# Patient Record
Sex: FEMALE | Race: BLACK OR AFRICAN AMERICAN | NOT HISPANIC OR LATINO | Employment: FULL TIME | URBAN - METROPOLITAN AREA
[De-identification: names, ages, dates, MRNs, and addresses within clinical notes are randomized per-mention and may not be internally consistent; named-entity substitution may affect disease eponyms.]

---

## 2017-01-16 PROCEDURE — 96372 THER/PROPH/DIAG INJ SC/IM: CPT

## 2017-01-16 PROCEDURE — 99283 EMERGENCY DEPT VISIT LOW MDM: CPT | Mod: 25

## 2017-01-17 ENCOUNTER — HOSPITAL ENCOUNTER (EMERGENCY)
Facility: HOSPITAL | Age: 62
Discharge: HOME | End: 2017-01-17
Attending: EMERGENCY MEDICINE
Payer: MEDICAID

## 2017-01-17 VITALS
HEIGHT: 67 IN | SYSTOLIC BLOOD PRESSURE: 102 MMHG | DIASTOLIC BLOOD PRESSURE: 65 MMHG | RESPIRATION RATE: 14 BRPM | OXYGEN SATURATION: 97 % | BODY MASS INDEX: 21.97 KG/M2 | HEART RATE: 68 BPM | WEIGHT: 140 LBS | TEMPERATURE: 99 F

## 2017-01-17 DIAGNOSIS — J06.9 VIRAL URI WITH COUGH: ICD-10-CM

## 2017-01-17 DIAGNOSIS — M54.31 SCIATICA OF RIGHT SIDE: Primary | ICD-10-CM

## 2017-01-17 PROCEDURE — 63600175 PHARM REV CODE 636 W HCPCS: Performed by: EMERGENCY MEDICINE

## 2017-01-17 RX ORDER — GUAIFENESIN/DEXTROMETHORPHAN 100-10MG/5
5 SYRUP ORAL 4 TIMES DAILY PRN
Qty: 120 ML | Refills: 0 | Status: SHIPPED | OUTPATIENT
Start: 2017-01-17 | End: 2017-01-27

## 2017-01-17 RX ORDER — NAPROXEN 500 MG/1
500 TABLET ORAL 2 TIMES DAILY WITH MEALS
Qty: 20 TABLET | Refills: 0 | Status: ON HOLD | OUTPATIENT
Start: 2017-01-17 | End: 2020-05-10

## 2017-01-17 RX ORDER — METHOCARBAMOL 750 MG/1
1500 TABLET, FILM COATED ORAL 3 TIMES DAILY
Qty: 30 TABLET | Refills: 0 | Status: SHIPPED | OUTPATIENT
Start: 2017-01-17 | End: 2017-01-27

## 2017-01-17 RX ORDER — BETAMETHASONE SODIUM PHOSPHATE AND BETAMETHASONE ACETATE 3; 3 MG/ML; MG/ML
12 INJECTION, SUSPENSION INTRA-ARTICULAR; INTRALESIONAL; INTRAMUSCULAR; SOFT TISSUE
Status: COMPLETED | OUTPATIENT
Start: 2017-01-17 | End: 2017-01-17

## 2017-01-17 RX ORDER — KETOROLAC TROMETHAMINE 30 MG/ML
10 INJECTION, SOLUTION INTRAMUSCULAR; INTRAVENOUS
Status: COMPLETED | OUTPATIENT
Start: 2017-01-17 | End: 2017-01-17

## 2017-01-17 RX ADMIN — KETOROLAC TROMETHAMINE 10 MG: 30 INJECTION, SOLUTION INTRAMUSCULAR at 01:01

## 2017-01-17 RX ADMIN — BETAMETHASONE SODIUM PHOSPHATE AND BETAMETHASONE ACETATE 12 MG: 3; 3 INJECTION, SUSPENSION INTRA-ARTICULAR; INTRALESIONAL; INTRAMUSCULAR at 01:01

## 2017-01-17 NOTE — ED AVS SNAPSHOT
[facility]  [place] [address]               Lindsey Rust   2017  1:22 AM   ED    Description:  Female : 1955   Department:  [facility]           Your Care was Coordinated By:     Provider Role From To    Rianna Gorman MD Attending Provider 17 [phone] --      Reason for Visit     Back Pain           Diagnoses this Visit        Comments    Sciatica of right side    -  Primary     Viral URI with cough           ED Disposition     ED Disposition Condition Comment    Discharge             To Do List           Follow-up Information     Follow up with winston meza In 1 week(s).       These Medications        Disp Refills Start End    methocarbamol (ROBAXIN) 750 MG Tab 30 tablet 0 2017    Take 2 tablets (1,500 mg total) by mouth 3 (three) times daily. - Oral    naproxen (NAPROSYN) 500 MG tablet 20 tablet 0 2017     Take 1 tablet (500 mg total) by mouth 2 (two) times daily with meals. - Oral    dextromethorphan-guaifenesin  mg/5 ml (ROBITUSSIN-DM)  mg/5 mL liquid 120 mL 0 2017    Take 5 mLs by mouth 4 (four) times daily as needed (cough). - Oral      [facility] On [facility]     [facility] -  Assistance  Registered nurses in the [facility] provide clinical advisement, health education, appointment booking, and other advisory services.  Call for this free service at [phone].             Medications           START taking these NEW medications        Refills    methocarbamol (ROBAXIN) 750 MG Tab 0    Sig: Take 2 tablets (1,500 mg total) by mouth 3 (three) times daily.    Class: Print    Route: Oral    naproxen (NAPROSYN) 500 MG tablet 0    Sig: Take 1 tablet (500 mg total) by mouth 2 (two) times daily with meals.    Class: Print    Route: Oral    dextromethorphan-guaifenesin  mg/5 ml (ROBITUSSIN-DM)  mg/5 mL liquid 0    Sig: Take 5 mLs  "by mouth 4 (four) times daily as needed (cough).    Class: Print    Route: Oral      These medications were administered today        Dose Freq    ketorolac injection 10 mg 10 mg ED 1 Time    Sig: Inject 10 mg into the muscle ED 1 Time.    Class: Normal    Route: Intramuscular    betamethasone acetate-betamethasone sodium phosphate injection 12 mg 12 mg ED 1 Time    Sig: Inject 2 mLs (12 mg total) into the muscle ED 1 Time.    Class: Normal    Route: Intramuscular           Verify that the below list of medications is an accurate representation of the medications you are currently taking.  If none reported, the list may be blank. If incorrect, please contact your healthcare provider. Carry this list with you in case of emergency.           Current Medications     dextromethorphan-guaifenesin  mg/5 ml (ROBITUSSIN-DM)  mg/5 mL liquid Take 5 mLs by mouth 4 (four) times daily as needed (cough).    methocarbamol (ROBAXIN) 750 MG Tab Take 2 tablets (1,500 mg total) by mouth 3 (three) times daily.    naproxen (NAPROSYN) 500 MG tablet Take 1 tablet (500 mg total) by mouth 2 (two) times daily with meals.           Clinical Reference Information           Your Vitals Were     BP Pulse Temp Resp Height Weight    116/65 (BP Location: Left arm, Patient Position: Sitting) 75 98.5 °F (36.9 °C) (Oral) 18 5' 7" (1.702 m) 63.5 kg (140 lb)    SpO2 BMI             95% 21.93 kg/m2         Allergies as of 1/17/2017     No Known Allergies      Immunizations Administered on Date of Encounter - 1/17/2017     None      ED Micro, Lab, POCT     None      ED Imaging Orders     None        Discharge Instructions           Sciatica    Sciatica is a condition that causes pain in the lower back and down into the buttock, hip, and leg. Sometimes the leg pain can happen without any back pain. Sciatica happens when a spinal nerve is irritated or has pressure put on it as comes out of the spinal canal in the lower back. This most often " happens when a bulge or rupture of a nearby spinal disk presses on the nerve. Sciatica can also be caused by a narrowing of the spinal canal (spinal stenosis) or spasm of the muscle in the buttocks that the sciatic nerve passes through (pyriform muscle). Sciatica is also called lumbar radiculopathy.  Sciatica may begin after a sudden twisting or bending force, such as in a car accident. Or it can happen after a simple awkward movement. In either case, muscle spasm often also happens. Muscle spasm makes the pain worse.  A health care provider makes a diagnosis of sciatica from your symptoms and a physical exam. Unless you had an injury from a car accident or fall, you usually wont have X-rays taken at this time. This is because the nerves and disks in your back cant be seen on an X-ray. If the provider sees signs of a compressed nerve, you will need to schedule an MRI scan as an outpatient. Signs of a compressed nerve include loss of strength in a leg.  Most sciatica gets better with medicine, exercise, and physical therapy. If your symptoms continue after at least 3 months of medical treatment, you may need surgery.  Home care  Follow these tips when caring for yourself at home:  · You may need to stay in bed the first few days. But as soon as possible, begin sitting or walking. This will help you avoid problems that come from staying in bed for long periods.  · When in bed, try to find a position that is comfortable. A firm mattress is best. Try lying flat on your back with pillows under your knees. You can also try lying on your side with your knees bent up toward your chest and a pillow between your knees.  · Avoid sitting for long periods. This puts more stress on your lower back than standing or walking.  · Use heat from a hot shower, hot bath, or heating pad to help ease pain. Massage can also help. You can also try using an ice pack. You can make your own ice pack by putting ice cubes in a plastic bag. Wrap  the bag in a thin towel. Try both heat and cold to see which works best. Use the method that feels best for 20 minutes several times a day.  · You may use acetaminophen or ibuprofen to ease pain, unless another pain medicine was prescribed. Note: If you have chronic liver or kidney disease, talk with your health care provider before taking these medicines. Also talk with your provider if youve had a stomach ulcer or GI bleeding.  · Use safe lifting methods. Dont lift anything heavier than 15 pounds until all of the pain is gone.  Follow-up care  Follow up with your health care provider if your symptoms dont start to get better after 1 week. You may need physical therapy or additional tests.  If X-rays were taken, they will be looked at by a radiologist. You will be told of any new findings that may affect your care.  When to seek medical advice  Call your health care provider right away if any of these occur:  · Pain gets worse even after taking prescribed medicine  · Weakness or numbness in 1 or both legs or hips  · Numbness in your groin or genital area  · You cant control your bowel or bladder  · Fever  · Redness or swelling over your back or spine   © 2960-5784 [employer]. [address] [place] [address]. All rights reserved. This information is not intended as a substitute for professional medical care. Always follow your healthcare professional's instructions.          Understanding Lumbar Radiculopathy    Lumbar radiculopathy is irritation or inflammation of a nerve root in the low back. It causes symptoms that spread out from the back down one or both legs. To understand this condition, it helps to understand the parts of the spine:  · Vertebrae. These are bones that stack to form the spine. The lumbar spine contains the 5 bottom vertebrae.  · Disks. These are soft pads of tissue between the vertebrae. They act as shock absorbers for the spine.  · Spinal canal. This is a tunnel  formed within the stacked vertebrae. In the lumbar spine, nerves run through this canal.  · Nerves. These branch off and leave the spinal canal, traveling out to parts of the body. As they leave the spinal canal, nerves pass through openings between the vertebrae. The nerve root is the part of the nerve that is closest to the spinal canal.  · Sciatic nerve. This is a large nerve formed from several nerve roots in the low back. This nerve extends down the back of the leg to the foot.  With lumbar radiculopathy, nerve roots in the low back become irritated. This leads to pain and symptoms. The sciatic nerve is commonly involved, so the condition is often called sciatica.  What causes lumbar radiculopathy?  Aging, injury, poor posture, extra body weight, and other issues can lead to problems in the low back. These problems may then irritate nerve roots. They include:  · Damage to a disk in the lumbar spine. The damaged disk may then press on nearby nerve roots.  · Degeneration from wear and tear, and aging. This can lead to narrowing (stenosis) of the openings between the vertebrae. The narrowed openings press on nerve roots as they leave the spinal canal.  · Unstable spine. This is when a vertebra slips forward. It can then press on a nerve root.  Other, less common things can put pressure on nerves in the low back. These include diabetes, infection, or a tumor.  Symptoms of lumbar radiculopathy  These include:  · Pain in the low back  · Pain, numbness, tingling, or weakness that travels into the buttocks, hip, groin, or leg  · Muscle spasms  Treatment for lumbar radiculopathy  In most cases, your healthcare provider will first try treatments that help relieve symptoms. These may include:  · Prescription and over-the-counter pain medicines. These help relieve pain, swelling, and irritation.  · Limits on positions and activities that increase pain. But lying in bed or avoiding all movement is only recommended for a  short period of time.  · Physical therapy, including exercises and stretches. This helps decrease pain and increase movement and function.  · Steroid shots into the lower back. This may help relieve symptoms for a time.  · Weight-loss program. If you are overweight, losing extra pounds may help relieve symptoms.  In some cases, you may need surgery to fix the underlying problem. This depends on the cause, the symptoms, and how long the pain has lasted.  Possible complications  Over time, an irritated and inflamed nerve may become damaged. This may lead to long-lasting (permanent) numbness or weakness in your legs and feet. If symptoms change suddenly or get worse, be sure to let your healthcare provider know.  When to call your healthcare provider  Call your healthcare provider right away if you have any of these:  · New pain or pain that gets worse  · New or increasing weakness, tingling, or numbness in your leg or foot  · Problems controlling your bladder or bowel   © 9457-9375 [employer]. [address] [place] [address]. All rights reserved. This information is not intended as a substitute for professional medical care. Always follow your healthcare professional's instructions.          Discharge References/Attachments     URI, VIRAL, NO ABX (ADULT) (ENGLISH)      [facility] Sign-Up     Activating your [facility] account is as easy as 1-2-3!     1) Visit [email or web address], select Sign Up Now, enter this activation code and your date of birth, then select Next.  [email or web address]  Expires: 3/3/2017  1:34 AM      2) Create a username and password to use when you visit [facility] in the future and select a security question in case you lose your password and select Next.    3) Enter your e-mail address and click Sign Up!    Additional Information  If you have questions, please e-mail [email or web address] or call [phone] to talk to our [facility] staff. Remember, [facility] is NOT to be used  for urgent needs. For medical emergencies, dial 911.         Smoking Cessation     If you would like to quit smoking:   You may be eligible for free services if you are a [place] resident and started smoking cigarettes before September 1, 1988.  Call the Smoking Cessation Trust (SCT) toll free at (003) 271-5146 or (798) 254-2036.   Call 1-800-QUIT-NOW if you do not meet the above criteria.             [facility] complies with applicable Federal civil rights laws and does not discriminate on the basis of race, color, national origin, age, disability, or sex.        Language Assistance Services     ATTENTION: Language assistance services are available, free of charge. Please call [phone].      ATENCIÓN: Si habla valarie, tiene a cornelius disposición servicios gratuitos de asistencia lingüística. Llame al [phone].     CHÚ Ý: N?u b?n nói Ti?ng Vi?t, có các d?ch v? h? tr? ngôn ng? mi?n phí dành cho b?n. G?i s? [phone].

## 2017-01-17 NOTE — ED TRIAGE NOTES
"Pt reports to ED with c/o R lower back sciatic nerve pain 8/10; pt also has been when R leg is moved; pt has hx sciatic nerve pain; pt states she also has "a cold in me" for the past few weeks; pt has no other complaints; pt AAOx4.  "

## 2017-01-17 NOTE — DISCHARGE INSTRUCTIONS
Sciatica    Sciatica is a condition that causes pain in the lower back and down into the buttock, hip, and leg. Sometimes the leg pain can happen without any back pain. Sciatica happens when a spinal nerve is irritated or has pressure put on it as comes out of the spinal canal in the lower back. This most often happens when a bulge or rupture of a nearby spinal disk presses on the nerve. Sciatica can also be caused by a narrowing of the spinal canal (spinal stenosis) or spasm of the muscle in the buttocks that the sciatic nerve passes through (pyriform muscle). Sciatica is also called lumbar radiculopathy.  Sciatica may begin after a sudden twisting or bending force, such as in a car accident. Or it can happen after a simple awkward movement. In either case, muscle spasm often also happens. Muscle spasm makes the pain worse.  A health care provider makes a diagnosis of sciatica from your symptoms and a physical exam. Unless you had an injury from a car accident or fall, you usually wont have X-rays taken at this time. This is because the nerves and disks in your back cant be seen on an X-ray. If the provider sees signs of a compressed nerve, you will need to schedule an MRI scan as an outpatient. Signs of a compressed nerve include loss of strength in a leg.  Most sciatica gets better with medicine, exercise, and physical therapy. If your symptoms continue after at least 3 months of medical treatment, you may need surgery.  Home care  Follow these tips when caring for yourself at home:  · You may need to stay in bed the first few days. But as soon as possible, begin sitting or walking. This will help you avoid problems that come from staying in bed for long periods.  · When in bed, try to find a position that is comfortable. A firm mattress is best. Try lying flat on your back with pillows under your knees. You can also try lying on your side with your knees bent up toward your chest and a pillow between your  knees.  · Avoid sitting for long periods. This puts more stress on your lower back than standing or walking.  · Use heat from a hot shower, hot bath, or heating pad to help ease pain. Massage can also help. You can also try using an ice pack. You can make your own ice pack by putting ice cubes in a plastic bag. Wrap the bag in a thin towel. Try both heat and cold to see which works best. Use the method that feels best for 20 minutes several times a day.  · You may use acetaminophen or ibuprofen to ease pain, unless another pain medicine was prescribed. Note: If you have chronic liver or kidney disease, talk with your health care provider before taking these medicines. Also talk with your provider if youve had a stomach ulcer or GI bleeding.  · Use safe lifting methods. Dont lift anything heavier than 15 pounds until all of the pain is gone.  Follow-up care  Follow up with your health care provider if your symptoms dont start to get better after 1 week. You may need physical therapy or additional tests.  If X-rays were taken, they will be looked at by a radiologist. You will be told of any new findings that may affect your care.  When to seek medical advice  Call your health care provider right away if any of these occur:  · Pain gets worse even after taking prescribed medicine  · Weakness or numbness in 1 or both legs or hips  · Numbness in your groin or genital area  · You cant control your bowel or bladder  · Fever  · Redness or swelling over your back or spine   © 2004-3456 [employer]. [address] [place] [address], [place] [address]. All rights reserved. This information is not intended as a substitute for professional medical care. Always follow your healthcare professional's instructions.          Understanding Lumbar Radiculopathy    Lumbar radiculopathy is irritation or inflammation of a nerve root in the low back. It causes symptoms that spread out from the back down one or both legs. To  understand this condition, it helps to understand the parts of the spine:  · Vertebrae. These are bones that stack to form the spine. The lumbar spine contains the 5 bottom vertebrae.  · Disks. These are soft pads of tissue between the vertebrae. They act as shock absorbers for the spine.  · Spinal canal. This is a tunnel formed within the stacked vertebrae. In the lumbar spine, nerves run through this canal.  · Nerves. These branch off and leave the spinal canal, traveling out to parts of the body. As they leave the spinal canal, nerves pass through openings between the vertebrae. The nerve root is the part of the nerve that is closest to the spinal canal.  · Sciatic nerve. This is a large nerve formed from several nerve roots in the low back. This nerve extends down the back of the leg to the foot.  With lumbar radiculopathy, nerve roots in the low back become irritated. This leads to pain and symptoms. The sciatic nerve is commonly involved, so the condition is often called sciatica.  What causes lumbar radiculopathy?  Aging, injury, poor posture, extra body weight, and other issues can lead to problems in the low back. These problems may then irritate nerve roots. They include:  · Damage to a disk in the lumbar spine. The damaged disk may then press on nearby nerve roots.  · Degeneration from wear and tear, and aging. This can lead to narrowing (stenosis) of the openings between the vertebrae. The narrowed openings press on nerve roots as they leave the spinal canal.  · Unstable spine. This is when a vertebra slips forward. It can then press on a nerve root.  Other, less common things can put pressure on nerves in the low back. These include diabetes, infection, or a tumor.  Symptoms of lumbar radiculopathy  These include:  · Pain in the low back  · Pain, numbness, tingling, or weakness that travels into the buttocks, hip, groin, or leg  · Muscle spasms  Treatment for lumbar radiculopathy  In most cases, your  healthcare provider will first try treatments that help relieve symptoms. These may include:  · Prescription and over-the-counter pain medicines. These help relieve pain, swelling, and irritation.  · Limits on positions and activities that increase pain. But lying in bed or avoiding all movement is only recommended for a short period of time.  · Physical therapy, including exercises and stretches. This helps decrease pain and increase movement and function.  · Steroid shots into the lower back. This may help relieve symptoms for a time.  · Weight-loss program. If you are overweight, losing extra pounds may help relieve symptoms.  In some cases, you may need surgery to fix the underlying problem. This depends on the cause, the symptoms, and how long the pain has lasted.  Possible complications  Over time, an irritated and inflamed nerve may become damaged. This may lead to long-lasting (permanent) numbness or weakness in your legs and feet. If symptoms change suddenly or get worse, be sure to let your healthcare provider know.  When to call your healthcare provider  Call your healthcare provider right away if you have any of these:  · New pain or pain that gets worse  · New or increasing weakness, tingling, or numbness in your leg or foot  · Problems controlling your bladder or bowel   © 6688-2298 [employer]. [address] [place] [address], [place] [address]. All rights reserved. This information is not intended as a substitute for professional medical care. Always follow your healthcare professional's instructions.

## 2017-01-17 NOTE — ED PROVIDER NOTES
"Encounter Date: 1/16/2017    SCRIBE #1 NOTE: I, Molly Kearney, am scribing for, and in the presence of,  Rianna Gorman MD. I have scribed the following portions of the note - Other sections scribed: HPI, ROS, and Physical Exam.       History     Chief Complaint   Patient presents with    Back Pain     "My sciatic nerve." Reports back pain radiating down legs.      Review of patient's allergies indicates:  No Known Allergies  HPI Comments: CC: Back Pain    HPI: This 61 y.o. Female who has sciatica presents to the ED c/o acute, constant, 8/10 right lower back pain that radiates down her bilateral, posterior legs that began yesterday.  Pt reports pain is worse with bending and walking.  Pt reports as a result of her pain, she almost fell on while attempting to complete her work duties.  Pt reports she is currently employed as a .  Pt attributes her pain to an exacerbation of her sciatica and states she typically receives an injection in the ED to help alleviate her pain.  Pt denies extremity numbness, extremity weakness, fever, chills, abdominal pain, rash, or any other associated symptoms.  No alleviating factors.    The history is provided by the patient. No  was used.     Past Medical History   Diagnosis Date    Sciatic pain      Past Medical History Pertinent Negatives   Diagnosis Date Noted    Asthma 4/4/2016    Diabetes mellitus 12/22/2012    Hypertension 12/22/2012    Seizures 4/4/2016     Past Surgical History   Procedure Laterality Date    Vaginal delivery       X5     Family History   Problem Relation Age of Onset    Diabetes Mother     Asthma Daughter     Asthma Son      Social History   Substance Use Topics    Smoking status: Current Some Day Smoker     Packs/day: 1.00     Years: 20.00     Types: Cigarettes    Smokeless tobacco: None    Alcohol use Yes      Comment: twice a week     Review of Systems   Constitutional: Negative for chills and fever.   HENT: Negative " for ear pain and sore throat.    Respiratory: Negative for cough and shortness of breath.    Gastrointestinal: Negative for abdominal pain, diarrhea, nausea and vomiting.   Genitourinary: Negative for dysuria.   Musculoskeletal: Positive for back pain and myalgias.   Skin: Negative for rash.   Neurological: Negative for weakness, numbness and headaches.       Physical Exam   Initial Vitals   BP Pulse Resp Temp SpO2   01/16/17 2328 01/16/17 2328 01/16/17 2328 01/16/17 2328 01/16/17 2328   116/65 75 18 98.5 °F (36.9 °C) 95 %     Physical Exam    Nursing note and vitals reviewed.  Constitutional: She appears well-developed and well-nourished.   HENT:   Head: Normocephalic and atraumatic.   Eyes: EOM are normal.   Neck: Neck supple.   Cardiovascular: Normal rate and regular rhythm.   Pulmonary/Chest: Breath sounds normal. No respiratory distress.   Abdominal: Normal appearance.   Musculoskeletal: Normal range of motion.   Patient has tenderness to palpation over the right sciatic nerve. Patient has a positive right sided straight leg test.   Neurological: She is alert and oriented to person, place, and time. She has normal strength. No cranial nerve deficit or sensory deficit.   Skin: Skin is warm and dry. No rash noted. No erythema.   Psychiatric: She has a normal mood and affect.         ED Course   Procedures  Labs Reviewed - No data to display          Medical Decision Making:   History:   Old Medical Records: I decided to obtain old medical records.  Initial Assessment:   This is an urgent evaluation of a 61 year old woman who presented with a complaint of right lower back pain consistent with her previous episodes of sciatica.  Differential Diagnosis:   Differential diagnoses included musculoskeletal strain versus sprain, cauda equina, sciatica  ED Management:  On physical examination, patient was in no acute distress.  Lung sounds were clear to auscultation bilaterally and heart sounds were within normal limits.   Abdomen was soft, nontender, nondistended with good bowel sounds throughout.  There is no tenderness over McBurney's or Carmona's points.  There were no peritoneal signs or CVA tenderness. Patient was neurologically intact without sensory, motor, or visual deficits.  Patient was not ataxic.  Patient had reproducible tenderness to palpation of the right sciatic nerve with a positive right-sided straight leg test.  Her symptoms are consistent with sciatica.  Patient was treated with Celestone and ketorolac in the emergency department.  She was discharged home with Robaxin and naproxen.  Of note, on discharge, patient reported she has had a cough for which she requested cough syrup.  Given lung sounds were clear to auscultation bilaterally, and oxygen saturation was within normal limits, I do not believe patient needs emergent imaging.  I will prescribe her however Robitussin-DM.  I've advised her to follow with her primary care physician for reevaluation in 2 days and have given her return precautions.    Rianna Gorman MD  4:12 AM  1/17/2017              Scribe Attestation:   Scribe #1: I performed the above scribed service and the documentation accurately describes the services I performed. I attest to the accuracy of the note.    Attending Attestation:           Physician Attestation for Scribe:  Physician Attestation Statement for Scribe #1: I, Rianna Gorman MD, reviewed documentation, as scribed by Molly Kearney in my presence, and it is both accurate and complete.                 ED Course     Clinical Impression:   The primary encounter diagnosis was Sciatica of right side. A diagnosis of Viral URI with cough was also pertinent to this visit.    Disposition:   Disposition: Discharged  Condition: Stable       Rianna Gorman MD  01/27/17 0413

## 2019-02-06 ENCOUNTER — HOSPITAL ENCOUNTER (EMERGENCY)
Facility: HOSPITAL | Age: 64
Discharge: HOME | End: 2019-02-06
Attending: EMERGENCY MEDICINE
Payer: MEDICAID

## 2019-02-06 VITALS
OXYGEN SATURATION: 96 % | BODY MASS INDEX: 24.48 KG/M2 | TEMPERATURE: 98 F | HEART RATE: 61 BPM | WEIGHT: 156 LBS | SYSTOLIC BLOOD PRESSURE: 148 MMHG | DIASTOLIC BLOOD PRESSURE: 63 MMHG | RESPIRATION RATE: 16 BRPM | HEIGHT: 67 IN

## 2019-02-06 DIAGNOSIS — M54.41 ACUTE RIGHT-SIDED LOW BACK PAIN WITH RIGHT-SIDED SCIATICA: Primary | ICD-10-CM

## 2019-02-06 DIAGNOSIS — M62.838 MUSCLE SPASM: ICD-10-CM

## 2019-02-06 PROCEDURE — 25000003 PHARM REV CODE 250: Performed by: NURSE PRACTITIONER

## 2019-02-06 PROCEDURE — 63600175 PHARM REV CODE 636 W HCPCS: Performed by: NURSE PRACTITIONER

## 2019-02-06 PROCEDURE — 96372 THER/PROPH/DIAG INJ SC/IM: CPT

## 2019-02-06 PROCEDURE — 99284 EMERGENCY DEPT VISIT MOD MDM: CPT | Mod: 25

## 2019-02-06 RX ORDER — DIAZEPAM 5 MG/1
5 TABLET ORAL EVERY 6 HOURS PRN
Qty: 10 TABLET | Refills: 0 | Status: ON HOLD | OUTPATIENT
Start: 2019-02-06 | End: 2023-11-29 | Stop reason: HOSPADM

## 2019-02-06 RX ORDER — HYDROCODONE BITARTRATE AND ACETAMINOPHEN 5; 325 MG/1; MG/1
1 TABLET ORAL EVERY 4 HOURS PRN
Qty: 18 TABLET | Refills: 0 | Status: ON HOLD | OUTPATIENT
Start: 2019-02-06 | End: 2023-11-29 | Stop reason: HOSPADM

## 2019-02-06 RX ORDER — DIAZEPAM 5 MG/1
5 TABLET ORAL
Status: COMPLETED | OUTPATIENT
Start: 2019-02-06 | End: 2019-02-06

## 2019-02-06 RX ORDER — PREDNISONE 20 MG/1
20 TABLET ORAL DAILY
Qty: 5 TABLET | Refills: 0 | Status: SHIPPED | OUTPATIENT
Start: 2019-02-06 | End: 2019-02-11

## 2019-02-06 RX ORDER — HYDROCODONE BITARTRATE AND ACETAMINOPHEN 5; 325 MG/1; MG/1
1 TABLET ORAL
Status: COMPLETED | OUTPATIENT
Start: 2019-02-06 | End: 2019-02-06

## 2019-02-06 RX ORDER — DEXAMETHASONE SODIUM PHOSPHATE 4 MG/ML
8 INJECTION, SOLUTION INTRA-ARTICULAR; INTRALESIONAL; INTRAMUSCULAR; INTRAVENOUS; SOFT TISSUE
Status: COMPLETED | OUTPATIENT
Start: 2019-02-06 | End: 2019-02-06

## 2019-02-06 RX ADMIN — HYDROCODONE BITARTRATE AND ACETAMINOPHEN 1 TABLET: 5; 325 TABLET ORAL at 11:02

## 2019-02-06 RX ADMIN — DEXAMETHASONE SODIUM PHOSPHATE 8 MG: 4 INJECTION, SOLUTION INTRAMUSCULAR; INTRAVENOUS at 11:02

## 2019-02-06 RX ADMIN — DIAZEPAM 5 MG: 5 TABLET ORAL at 11:02

## 2019-02-06 NOTE — ED PROVIDER NOTES
Encounter Date: 2/6/2019       History     Chief Complaint   Patient presents with    Back Pain     rEPORTS HAVING LOWER BACK PAIN TO RIGHT LEG FO PAST FEW WEEKS     Patient presents to ER with right-sided sciatica that is radiating down right leg.  Patient states she has had the pain for about a week now.  Patient states she has had this pain on and off before and comes and goes.  Patient states she has not had a flare-up in a while.  Patient denies any tingling or numbness of extremities.  Patient denies any bowel or bladder dysfunction.  Patient ambulated in the ER.  Patient states she has been taking over-the-counter naproxen which has not been helping very much.  Patient denies any fever, chills, nausea, vomiting, shortness of breath, dizziness.  Patient has no prior medical problems except sciatica pain and takes no prescription medications at this time.          Review of patient's allergies indicates:  No Known Allergies  Past Medical History:   Diagnosis Date    Sciatic pain      Past Surgical History:   Procedure Laterality Date    VAGINAL DELIVERY      X5     Family History   Problem Relation Age of Onset    Diabetes Mother     Asthma Daughter     Asthma Son      Social History     Tobacco Use    Smoking status: Current Some Day Smoker     Packs/day: 0.50     Years: 20.00     Pack years: 10.00     Types: Cigarettes   Substance Use Topics    Alcohol use: Yes     Comment: twice a week    Drug use: No     Review of Systems   Constitutional: Negative.  Negative for activity change, appetite change, diaphoresis, fatigue and fever.   HENT: Negative.  Negative for dental problem, ear discharge and sore throat.    Eyes: Negative.    Respiratory: Negative.  Negative for shortness of breath.    Cardiovascular: Negative.  Negative for chest pain.   Gastrointestinal: Negative.  Negative for nausea.   Endocrine: Negative.    Genitourinary: Negative for dysuria, hematuria and urgency.   Musculoskeletal:  Positive for back pain. Negative for gait problem and joint swelling.   Skin: Negative for rash.   Allergic/Immunologic: Negative.    Neurological: Negative.  Negative for weakness.   Hematological: Negative.  Does not bruise/bleed easily.   Psychiatric/Behavioral: Negative for agitation.   All other systems reviewed and are negative.      Physical Exam     Initial Vitals [02/06/19 0939]   BP Pulse Resp Temp SpO2   136/78 74 18 98.3 °F (36.8 °C) 97 %      MAP       --         Physical Exam    Nursing note and vitals reviewed.  Constitutional: She appears well-developed and well-nourished. She is not diaphoretic. She is cooperative.   HENT:   Head: Normocephalic and atraumatic.   Right Ear: External ear normal.   Left Ear: External ear normal.   Nose: Nose normal.   Mouth/Throat: Oropharynx is clear and moist.   Eyes: Conjunctivae and EOM are normal. Pupils are equal, round, and reactive to light. Right eye exhibits no discharge. No scleral icterus.   Neck: Trachea normal, normal range of motion and full passive range of motion without pain. Neck supple. No thyromegaly present. No tracheal deviation and normal range of motion present. No neck rigidity. No JVD present.   Cardiovascular: Normal rate, regular rhythm, normal heart sounds, intact distal pulses and normal pulses. Exam reveals no gallop and no friction rub.    No murmur heard.  Pulmonary/Chest: Effort normal and breath sounds normal. No stridor. No respiratory distress. She has no wheezes. She has no rhonchi. She has no rales. She exhibits no tenderness.   Abdominal: Soft. Normal appearance and bowel sounds are normal. She exhibits no distension and no mass. There is no tenderness. There is no guarding.   Musculoskeletal: She exhibits no edema.        Right shoulder: She exhibits normal range of motion and no bony tenderness.        Lumbar back: She exhibits decreased range of motion, tenderness, pain and spasm. She exhibits no bony tenderness, no edema and  no deformity.        Back:    Lymphadenopathy:     She has no cervical adenopathy.   Neurological: She is alert and oriented to person, place, and time. She has normal strength and normal reflexes.   Skin: Skin is warm and dry. No rash noted.   Psychiatric: She has a normal mood and affect. Her behavior is normal. Judgment and thought content normal.         ED Course   Procedures  Labs Reviewed - No data to display       Imaging Results    None          Medical Decision Making:   Differential Diagnosis:   Lower back pain, sciatic, cervical radiculopathy, muscle spasm                      Clinical Impression:   The primary encounter diagnosis was Acute right-sided low back pain with right-sided sciatica. A diagnosis of Muscle spasm was also pertinent to this visit.      Disposition:   Disposition: Discharged  Condition: Stable                        Adeline Brooks, TIMOTHY  02/06/19 1150

## 2019-02-06 NOTE — ED TRIAGE NOTES
Patient reports lower back pain that radiates to right leg x 2 weeks. Reports taking Tylenol for the pain with minimal relielf, last taken on yesterday.

## 2020-05-10 ENCOUNTER — HOSPITAL ENCOUNTER (OUTPATIENT)
Facility: HOSPITAL | Age: 65
LOS: 1 days | Discharge: HOME | End: 2020-05-11
Attending: EMERGENCY MEDICINE | Admitting: INTERNAL MEDICINE
Payer: MEDICAID

## 2020-05-10 DIAGNOSIS — A41.9 SEPSIS: ICD-10-CM

## 2020-05-10 DIAGNOSIS — N12 PYELONEPHRITIS: Primary | ICD-10-CM

## 2020-05-10 PROBLEM — Z78.9 ALCOHOL USE: Status: ACTIVE | Noted: 2020-05-10

## 2020-05-10 PROBLEM — N10 ACUTE PYELONEPHRITIS: Status: ACTIVE | Noted: 2020-05-10

## 2020-05-10 PROBLEM — E87.6 HYPOKALEMIA: Status: ACTIVE | Noted: 2020-05-10

## 2020-05-10 LAB
ALBUMIN SERPL BCP-MCNC: 3.6 G/DL (ref 3.5–5.2)
ALP SERPL-CCNC: 108 U/L (ref 55–135)
ALT SERPL W/O P-5'-P-CCNC: 19 U/L (ref 10–44)
ANION GAP SERPL CALC-SCNC: 17 MMOL/L (ref 8–16)
ANION GAP SERPL CALC-SCNC: 18 MMOL/L (ref 8–16)
AST SERPL-CCNC: 22 U/L (ref 10–40)
BACTERIA #/AREA URNS HPF: ABNORMAL /HPF
BASOPHILS # BLD AUTO: 0.03 K/UL (ref 0–0.2)
BASOPHILS NFR BLD: 0.2 % (ref 0–1.9)
BILIRUB SERPL-MCNC: 1 MG/DL (ref 0.1–1)
BILIRUB UR QL STRIP: NEGATIVE
BUN SERPL-MCNC: 9 MG/DL (ref 6–30)
BUN SERPL-MCNC: 9 MG/DL (ref 8–23)
CALCIUM SERPL-MCNC: 9.9 MG/DL (ref 8.7–10.5)
CHLORIDE SERPL-SCNC: 101 MMOL/L (ref 95–110)
CHLORIDE SERPL-SCNC: 99 MMOL/L (ref 95–110)
CHOLEST SERPL-MCNC: 151 MG/DL (ref 120–199)
CHOLEST/HDLC SERPL: 2.8 {RATIO} (ref 2–5)
CLARITY UR: ABNORMAL
CO2 SERPL-SCNC: 26 MMOL/L (ref 23–29)
COLOR UR: ABNORMAL
CREAT SERPL-MCNC: 1.2 MG/DL (ref 0.5–1.4)
CREAT SERPL-MCNC: 1.2 MG/DL (ref 0.5–1.4)
DIFFERENTIAL METHOD: ABNORMAL
EOSINOPHIL # BLD AUTO: 0 K/UL (ref 0–0.5)
EOSINOPHIL NFR BLD: 0 % (ref 0–8)
ERYTHROCYTE [DISTWIDTH] IN BLOOD BY AUTOMATED COUNT: 14.5 % (ref 11.5–14.5)
EST. GFR  (AFRICAN AMERICAN): 56 ML/MIN/1.73 M^2
EST. GFR  (NON AFRICAN AMERICAN): 49 ML/MIN/1.73 M^2
GLUCOSE SERPL-MCNC: 112 MG/DL (ref 70–110)
GLUCOSE SERPL-MCNC: 117 MG/DL (ref 70–110)
GLUCOSE UR QL STRIP: NEGATIVE
HCT VFR BLD AUTO: 46.3 % (ref 37–48.5)
HCT VFR BLD CALC: 49 %PCV (ref 36–54)
HDLC SERPL-MCNC: 54 MG/DL (ref 40–75)
HDLC SERPL: 35.8 % (ref 20–50)
HGB BLD-MCNC: 15 G/DL (ref 12–16)
HGB UR QL STRIP: ABNORMAL
HYALINE CASTS #/AREA URNS LPF: 0 /LPF
IMM GRANULOCYTES # BLD AUTO: 0.1 K/UL (ref 0–0.04)
IMM GRANULOCYTES NFR BLD AUTO: 0.5 % (ref 0–0.5)
KETONES UR QL STRIP: NEGATIVE
LACTATE SERPL-SCNC: 1.4 MMOL/L (ref 0.5–2.2)
LDLC SERPL CALC-MCNC: 80.2 MG/DL (ref 63–159)
LEUKOCYTE ESTERASE UR QL STRIP: ABNORMAL
LIPASE SERPL-CCNC: 8 U/L (ref 4–60)
LYMPHOCYTES # BLD AUTO: 1.6 K/UL (ref 1–4.8)
LYMPHOCYTES NFR BLD: 8.8 % (ref 18–48)
MCH RBC QN AUTO: 31.8 PG (ref 27–31)
MCHC RBC AUTO-ENTMCNC: 32.4 G/DL (ref 32–36)
MCV RBC AUTO: 98 FL (ref 82–98)
MICROSCOPIC COMMENT: ABNORMAL
MONOCYTES # BLD AUTO: 1.4 K/UL (ref 0.3–1)
MONOCYTES NFR BLD: 7.4 % (ref 4–15)
NEUTROPHILS # BLD AUTO: 15.3 K/UL (ref 1.8–7.7)
NEUTROPHILS NFR BLD: 83.1 % (ref 38–73)
NITRITE UR QL STRIP: POSITIVE
NONHDLC SERPL-MCNC: 97 MG/DL
NRBC BLD-RTO: 0 /100 WBC
PH UR STRIP: 6 [PH] (ref 5–8)
PLATELET # BLD AUTO: 290 K/UL (ref 150–350)
PMV BLD AUTO: 10.3 FL (ref 9.2–12.9)
POC IONIZED CALCIUM: 1.14 MMOL/L (ref 1.06–1.42)
POC TCO2 (MEASURED): 30 MMOL/L (ref 23–29)
POCT GLUCOSE: 135 MG/DL (ref 70–110)
POTASSIUM BLD-SCNC: 3.3 MMOL/L (ref 3.5–5.1)
POTASSIUM SERPL-SCNC: 3 MMOL/L (ref 3.5–5.1)
PROT SERPL-MCNC: 8.8 G/DL (ref 6–8.4)
PROT UR QL STRIP: ABNORMAL
RBC # BLD AUTO: 4.71 M/UL (ref 4–5.4)
RBC #/AREA URNS HPF: >100 /HPF (ref 0–4)
SAMPLE: ABNORMAL
SARS-COV-2 RDRP RESP QL NAA+PROBE: NEGATIVE
SODIUM BLD-SCNC: 142 MMOL/L (ref 136–145)
SODIUM SERPL-SCNC: 144 MMOL/L (ref 136–145)
SP GR UR STRIP: 1.01 (ref 1–1.03)
TRIGL SERPL-MCNC: 84 MG/DL (ref 30–150)
TSH SERPL DL<=0.005 MIU/L-ACNC: 0.53 UIU/ML (ref 0.4–4)
URN SPEC COLLECT METH UR: ABNORMAL
UROBILINOGEN UR STRIP-ACNC: ABNORMAL EU/DL
WBC # BLD AUTO: 18.44 K/UL (ref 3.9–12.7)
WBC #/AREA URNS HPF: >100 /HPF (ref 0–5)

## 2020-05-10 PROCEDURE — 83605 ASSAY OF LACTIC ACID: CPT

## 2020-05-10 PROCEDURE — 87086 URINE CULTURE/COLONY COUNT: CPT

## 2020-05-10 PROCEDURE — 82330 ASSAY OF CALCIUM: CPT

## 2020-05-10 PROCEDURE — G0378 HOSPITAL OBSERVATION PER HR: HCPCS

## 2020-05-10 PROCEDURE — 63600175 PHARM REV CODE 636 W HCPCS: Performed by: INTERNAL MEDICINE

## 2020-05-10 PROCEDURE — 83690 ASSAY OF LIPASE: CPT

## 2020-05-10 PROCEDURE — 25500020 PHARM REV CODE 255: Performed by: PHYSICIAN ASSISTANT

## 2020-05-10 PROCEDURE — 84132 ASSAY OF SERUM POTASSIUM: CPT | Mod: 91

## 2020-05-10 PROCEDURE — 36415 COLL VENOUS BLD VENIPUNCTURE: CPT

## 2020-05-10 PROCEDURE — 99900035 HC TECH TIME PER 15 MIN (STAT)

## 2020-05-10 PROCEDURE — 96365 THER/PROPH/DIAG IV INF INIT: CPT | Mod: 59

## 2020-05-10 PROCEDURE — 84443 ASSAY THYROID STIM HORMONE: CPT

## 2020-05-10 PROCEDURE — 81000 URINALYSIS NONAUTO W/SCOPE: CPT

## 2020-05-10 PROCEDURE — 80053 COMPREHEN METABOLIC PANEL: CPT

## 2020-05-10 PROCEDURE — 63600175 PHARM REV CODE 636 W HCPCS: Performed by: PHYSICIAN ASSISTANT

## 2020-05-10 PROCEDURE — 25000003 PHARM REV CODE 250: Performed by: INTERNAL MEDICINE

## 2020-05-10 PROCEDURE — 80061 LIPID PANEL: CPT

## 2020-05-10 PROCEDURE — 87040 BLOOD CULTURE FOR BACTERIA: CPT

## 2020-05-10 PROCEDURE — 87186 SC STD MICRODIL/AGAR DIL: CPT

## 2020-05-10 PROCEDURE — 85025 COMPLETE CBC W/AUTO DIFF WBC: CPT

## 2020-05-10 PROCEDURE — 87077 CULTURE AEROBIC IDENTIFY: CPT

## 2020-05-10 PROCEDURE — 82565 ASSAY OF CREATININE: CPT | Mod: 91

## 2020-05-10 PROCEDURE — 93005 ELECTROCARDIOGRAM TRACING: CPT

## 2020-05-10 PROCEDURE — 99291 CRITICAL CARE FIRST HOUR: CPT | Mod: 25

## 2020-05-10 PROCEDURE — U0002 COVID-19 LAB TEST NON-CDC: HCPCS

## 2020-05-10 PROCEDURE — 87088 URINE BACTERIA CULTURE: CPT

## 2020-05-10 PROCEDURE — 96361 HYDRATE IV INFUSION ADD-ON: CPT

## 2020-05-10 PROCEDURE — 85014 HEMATOCRIT: CPT

## 2020-05-10 PROCEDURE — 93010 ELECTROCARDIOGRAM REPORT: CPT | Mod: ,,, | Performed by: INTERNAL MEDICINE

## 2020-05-10 PROCEDURE — 25000003 PHARM REV CODE 250: Performed by: PHYSICIAN ASSISTANT

## 2020-05-10 PROCEDURE — 96375 TX/PRO/DX INJ NEW DRUG ADDON: CPT

## 2020-05-10 PROCEDURE — 83036 HEMOGLOBIN GLYCOSYLATED A1C: CPT

## 2020-05-10 PROCEDURE — 84295 ASSAY OF SERUM SODIUM: CPT

## 2020-05-10 PROCEDURE — 93010 EKG 12-LEAD: ICD-10-PCS | Mod: ,,, | Performed by: INTERNAL MEDICINE

## 2020-05-10 RX ORDER — IBUPROFEN 200 MG
24 TABLET ORAL
Status: DISCONTINUED | OUTPATIENT
Start: 2020-05-10 | End: 2020-05-11 | Stop reason: HOSPADM

## 2020-05-10 RX ORDER — ACETAMINOPHEN 325 MG/1
650 TABLET ORAL EVERY 8 HOURS PRN
Status: DISCONTINUED | OUTPATIENT
Start: 2020-05-10 | End: 2020-05-11 | Stop reason: HOSPADM

## 2020-05-10 RX ORDER — GLUCAGON 1 MG
1 KIT INJECTION
Status: DISCONTINUED | OUTPATIENT
Start: 2020-05-10 | End: 2020-05-11 | Stop reason: HOSPADM

## 2020-05-10 RX ORDER — INSULIN ASPART 100 [IU]/ML
0-5 INJECTION, SOLUTION INTRAVENOUS; SUBCUTANEOUS
Status: DISCONTINUED | OUTPATIENT
Start: 2020-05-10 | End: 2020-05-11 | Stop reason: HOSPADM

## 2020-05-10 RX ORDER — ONDANSETRON 2 MG/ML
4 INJECTION INTRAMUSCULAR; INTRAVENOUS EVERY 8 HOURS PRN
Status: DISCONTINUED | OUTPATIENT
Start: 2020-05-10 | End: 2020-05-11 | Stop reason: HOSPADM

## 2020-05-10 RX ORDER — LANOLIN ALCOHOL/MO/W.PET/CERES
800 CREAM (GRAM) TOPICAL
Status: DISCONTINUED | OUTPATIENT
Start: 2020-05-10 | End: 2020-05-11 | Stop reason: HOSPADM

## 2020-05-10 RX ORDER — POTASSIUM CHLORIDE 20 MEQ/15ML
40 SOLUTION ORAL
Status: DISCONTINUED | OUTPATIENT
Start: 2020-05-10 | End: 2020-05-11 | Stop reason: HOSPADM

## 2020-05-10 RX ORDER — HYDROCODONE BITARTRATE AND ACETAMINOPHEN 5; 325 MG/1; MG/1
2 TABLET ORAL EVERY 4 HOURS PRN
Status: DISCONTINUED | OUTPATIENT
Start: 2020-05-10 | End: 2020-05-11 | Stop reason: HOSPADM

## 2020-05-10 RX ORDER — SODIUM CHLORIDE 0.9 % (FLUSH) 0.9 %
10 SYRINGE (ML) INJECTION
Status: DISCONTINUED | OUTPATIENT
Start: 2020-05-10 | End: 2020-05-11 | Stop reason: HOSPADM

## 2020-05-10 RX ORDER — DIAZEPAM 5 MG/1
5 TABLET ORAL EVERY 6 HOURS PRN
Status: DISCONTINUED | OUTPATIENT
Start: 2020-05-10 | End: 2020-05-11 | Stop reason: HOSPADM

## 2020-05-10 RX ORDER — IBUPROFEN 200 MG
16 TABLET ORAL
Status: DISCONTINUED | OUTPATIENT
Start: 2020-05-10 | End: 2020-05-11 | Stop reason: HOSPADM

## 2020-05-10 RX ORDER — KETOROLAC TROMETHAMINE 30 MG/ML
10 INJECTION, SOLUTION INTRAMUSCULAR; INTRAVENOUS
Status: COMPLETED | OUTPATIENT
Start: 2020-05-10 | End: 2020-05-10

## 2020-05-10 RX ORDER — ONDANSETRON 2 MG/ML
4 INJECTION INTRAMUSCULAR; INTRAVENOUS EVERY 8 HOURS PRN
Status: DISCONTINUED | OUTPATIENT
Start: 2020-05-10 | End: 2020-05-10

## 2020-05-10 RX ORDER — SODIUM CHLORIDE AND POTASSIUM CHLORIDE 150; 450 MG/100ML; MG/100ML
INJECTION, SOLUTION INTRAVENOUS CONTINUOUS
Status: DISCONTINUED | OUTPATIENT
Start: 2020-05-10 | End: 2020-05-11 | Stop reason: HOSPADM

## 2020-05-10 RX ORDER — ACETAMINOPHEN 500 MG
1000 TABLET ORAL
Status: COMPLETED | OUTPATIENT
Start: 2020-05-10 | End: 2020-05-10

## 2020-05-10 RX ADMIN — SODIUM CHLORIDE AND POTASSIUM CHLORIDE: 4.5; 1.49 INJECTION, SOLUTION INTRAVENOUS at 06:05

## 2020-05-10 RX ADMIN — KETOROLAC TROMETHAMINE 10 MG: 30 INJECTION, SOLUTION INTRAMUSCULAR at 02:05

## 2020-05-10 RX ADMIN — SODIUM CHLORIDE 1974 ML: 0.9 INJECTION, SOLUTION INTRAVENOUS at 02:05

## 2020-05-10 RX ADMIN — IOHEXOL 75 ML: 350 INJECTION, SOLUTION INTRAVENOUS at 03:05

## 2020-05-10 RX ADMIN — ACETAMINOPHEN 1000 MG: 500 TABLET ORAL at 02:05

## 2020-05-10 RX ADMIN — CEFTRIAXONE 2 G: 2 INJECTION, SOLUTION INTRAVENOUS at 03:05

## 2020-05-10 RX ADMIN — HYDROCODONE BITARTRATE AND ACETAMINOPHEN 2 TABLET: 5; 325 TABLET ORAL at 08:05

## 2020-05-10 NOTE — HPI
This 64 y.o black woman came to ED with right flank pain and anorexia that started on Thursday afternoon and has persisted since then. She has been able to take in water without any N/V but hasn't eaten anything solid. No change in pain with eating and only movement and the end of urination makes the pain worse. She hasn't had any fever or cough. She denies any recent antibiotic use, no N/V/D. She is talking on the phone when I enter the room and arises easily to walk around and sit without any balance issues. I was called for admission for pyelonephritis and concern for sepsis as the patient's heart rate was still 106 after about 2L of IV were ordered for her. She has since only received about 250cc of the initial order with the rest to be given and her HR is 85 and regular, her fever has subsided after tylenol and she has a significant leukocytosis with left shift, negative covid results. CT shows cystitis and right pyelonephritis without hydronephrosis

## 2020-05-10 NOTE — NURSING
Pt. Arrived to Med/Surg unit from ED via wheelchair. Alert and oriented x4. Able to voice needs. C/o R lower flank pain since Thursday 8/10. Respirations are even and unlabored. Abd distention noted. Oriented pt to room and call light system. Pt was able to ambulate to the restroom and was steady on her feet. Bed in lowest position. Call light in reach. Bed alarm activated and audible. Will continue to monitor.

## 2020-05-10 NOTE — ED NOTES
"Attempted to call report to Rajani, SANA, stated "I can't take report right now, I am in a patient's room.  Call me back in 15 minutes."  Statement acknowledged and Rajani was asked to call back when she was able to take report, she verbally acknowledge request with an "OK."  "

## 2020-05-10 NOTE — ED PROVIDER NOTES
Encounter Date: 5/10/2020    SCRIBE #1 NOTE: I, Giovanna Workman, am scribing for, and in the presence of,  Shavon Oliva PA-C. I have scribed the following portions of the note - Other sections scribed: HPI, ROS, PE.       History     Chief Complaint   Patient presents with    Abdominal Pain     right lower abd pain x 2 days.  denies n/v/d or fever.   states frequency and urgency with urination.  pt states gallbladder is acting up.     CC: Abdominal Pain    HPI: This 64 y.o female, with a medical history of sciatic pain, presents to the ED c/o right lower quadrant abdominal pain with an associated decrease in appetite since Friday. Pt reports that the pain is exacerbated with movement, breathing and palpation to the area. The symptoms are acute, constant and severe (8/10). Pt notes consuming her last alcoholic beverage 4-5 days ago. Pt denies emesis, nausea, diarrhea, constipation, blood in stool, dysuria, urinary frequency, vaginal discharge, vaginal pain, fever, chest pain, shortness of breath or any other associated symptoms. No treatment attempted PTA to the ED. No alleviating factors.     The history is provided by the patient.     Review of patient's allergies indicates:  No Known Allergies  Past Medical History:   Diagnosis Date    Sciatic pain      Past Surgical History:   Procedure Laterality Date    VAGINAL DELIVERY      X5     Family History   Problem Relation Age of Onset    Diabetes Mother     Asthma Daughter     Asthma Son      Social History     Tobacco Use    Smoking status: Current Some Day Smoker     Packs/day: 0.50     Years: 20.00     Pack years: 10.00     Types: Cigarettes   Substance Use Topics    Alcohol use: Yes     Comment: twice a week    Drug use: No     Review of Systems   Constitutional: Positive for appetite change (decrease). Negative for chills and fever.   HENT: Negative for congestion, rhinorrhea and sore throat.    Eyes: Negative for visual disturbance.   Respiratory:  Negative for cough and shortness of breath.    Cardiovascular: Negative for chest pain.   Gastrointestinal: Positive for abdominal pain (right lower quadrant). Negative for blood in stool, constipation, diarrhea, nausea and vomiting.   Genitourinary: Negative for dysuria, frequency, hematuria, vaginal discharge and vaginal pain.   Musculoskeletal: Negative for back pain.   Skin: Negative for rash.   Neurological: Negative for dizziness, weakness and headaches.       Physical Exam     Initial Vitals [05/10/20 1258]   BP Pulse Resp Temp SpO2   122/63 (!) 120 18 99.6 °F (37.6 °C) 95 %      MAP       --         Physical Exam    Nursing note and vitals reviewed.  Constitutional: She appears well-developed and well-nourished. She is not diaphoretic. No distress.   HENT:   Head: Normocephalic and atraumatic.   Mouth/Throat: Oropharynx is clear and moist. No oropharyngeal exudate.   Eyes: Conjunctivae and EOM are normal. Pupils are equal, round, and reactive to light.   Neck: Normal range of motion. Neck supple.   Cardiovascular: Regular rhythm, normal heart sounds and intact distal pulses. Tachycardia present.    Pulmonary/Chest: Breath sounds normal. No respiratory distress. She has no wheezes. She has no rhonchi. She has no rales.   Abdominal: Soft. Bowel sounds are normal. She exhibits no distension. There is tenderness. There is rebound. There is no guarding.   The abdomen is soft and non distended with ttp to the RLQ. Rebound tenderness present to the RLQ. No guarding.   Musculoskeletal: Normal range of motion.   Lymphadenopathy:     She has no cervical adenopathy.   Neurological: She is alert and oriented to person, place, and time. GCS score is 15. GCS eye subscore is 4. GCS verbal subscore is 5. GCS motor subscore is 6.   Skin: Skin is warm and dry. Capillary refill takes less than 2 seconds.   Psychiatric: She has a normal mood and affect. Thought content normal.         ED Course   Critical Care  Date/Time:  5/19/2020 12:26 PM  Performed by: Shavon Oliva PA-C  Authorized by: El Hartley MD   Direct patient critical care time: 10 minutes  Additional history critical care time: 5 minutes  Ordering / reviewing critical care time: 5 minutes  Documentation critical care time: 5 minutes  Consulting other physicians critical care time: 5 minutes  Other critical care time: 5 minutes  Total critical care time (exclusive of procedural time) : 35 minutes  Critical care was necessary to treat or prevent imminent or life-threatening deterioration of the following conditions: sepsis.  Critical care was time spent personally by me on the following activities: development of treatment plan with patient or surrogate, discussions with consultants, evaluation of patient's response to treatment, examination of patient, obtaining history from patient or surrogate, ordering and performing treatments and interventions, ordering and review of laboratory studies, ordering and review of radiographic studies, pulse oximetry, re-evaluation of patient's condition and review of old charts.        Labs Reviewed   CBC W/ AUTO DIFFERENTIAL - Abnormal; Notable for the following components:       Result Value    WBC 18.44 (*)     Mean Corpuscular Hemoglobin 31.8 (*)     Gran # (ANC) 15.3 (*)     Immature Grans (Abs) 0.10 (*)     Mono # 1.4 (*)     Gran% 83.1 (*)     Lymph% 8.8 (*)     All other components within normal limits   URINALYSIS, REFLEX TO URINE CULTURE - Abnormal; Notable for the following components:    Appearance, UA Cloudy (*)     Protein, UA 2+ (*)     Occult Blood UA 3+ (*)     Nitrite, UA Positive (*)     Urobilinogen, UA 4.0-6.0 (*)     Leukocytes, UA 3+ (*)     All other components within normal limits    Narrative:     Preferred Collection Type->Urine, Clean Catch   URINALYSIS MICROSCOPIC - Abnormal; Notable for the following components:    RBC, UA >100 (*)     WBC, UA >100 (*)     Bacteria Moderate (*)     All other components  within normal limits    Narrative:     Preferred Collection Type->Urine, Clean Catch   COMPREHENSIVE METABOLIC PANEL - Abnormal; Notable for the following components:    Potassium 3.0 (*)     Glucose 112 (*)     Total Protein 8.8 (*)     Anion Gap 17 (*)     eGFR if  56 (*)     eGFR if non  49 (*)     All other components within normal limits   ISTAT PROCEDURE - Abnormal; Notable for the following components:    POC Glucose 117 (*)     POC Potassium 3.3 (*)     POC TCO2 (MEASURED) 30 (*)     POC Anion Gap 18 (*)     All other components within normal limits   CULTURE, URINE   CULTURE, BLOOD   CULTURE, BLOOD   LIPASE   SARS-COV-2 RNA AMPLIFICATION, QUAL    Narrative:     What symptom criteria does the patient meet?->Fever   LACTIC ACID, PLASMA   ISTAT CHEM8     EKG Readings: (Independently Interpreted)   This patient is in a sinus tachycardia with a heart rate of 103.  There are nonspecific ST segment and T-wave changes.  The patient has left axis deviation.  There is no definite evidence of acute myocardial infarction or malignant arrhythmia.       Imaging Results          X-Ray Chest 1 View (Final result)  Result time 05/10/20 16:19:17    Final result by Cedric Clay MD (05/10/20 16:19:17)                 Impression:      No radiographic evidence of pneumonia or other source of sepsis, noting that early/mild viral pneumonia may be radiographically occult.      Electronically signed by: Cedric Clay MD  Date:    05/10/2020  Time:    16:19             Narrative:    EXAMINATION:  XR CHEST 1 VIEW    CLINICAL HISTORY:  Sepsis, unspecified organism    TECHNIQUE:  Single frontal view of the chest was performed.    COMPARISON:  Chest radiograph 03/29/2016    FINDINGS:  No detrimental change.  Cardiomediastinal silhouette is midline and within normal limits for age noting calcification at the arch.  Pulmonary vasculature and hilar contours are within normal limits.  Few scattered linear  opacities consistent with platelike scarring versus atelectasis.  The lungs are otherwise symmetrically well expanded without focal consolidation, pleural effusion or pneumothorax.  No acute osseous process seen.  PA and lateral views can be obtained.                                CT Abdomen Pelvis With Contrast (Final result)  Result time 05/10/20 15:28:55    Final result by Americo Galindo MD (05/10/20 15:28:55)                 Impression:      1. Findings suggesting right pyelonephritis.  Recommend clinical correlation and follow-up.  2. Mild wall thickening of the urinary bladder.  This could represent mild cystitis.  3. Mild hepatic steatosis.  4. Small fat containing umbilical hernia.  5. 2.2 cm uterine mass nonspecific likely representing uterine fibroid.  Recommend outpatient sonographic surveillance.  6. Multilevel chronic degenerative changes of the lumbar spine.  7.  This report was flagged in Epic as abnormal.      Electronically signed by: Americo Galindo  Date:    05/10/2020  Time:    15:28             Narrative:    EXAMINATION:  CT ABDOMEN PELVIS WITH CONTRAST    CLINICAL HISTORY:  Abd pain, fever, abscess suspected;    TECHNIQUE:  Low dose axial images, sagittal and coronal reformations were obtained from the lung bases to the pubic symphysis following the IV administration of 75 mL of Omnipaque 350 .  Oral contrast was not administered.    COMPARISON:  None.    FINDINGS:  Abdomen:    - Lower thorax:    - Lung bases: No infiltrates and no nodules.    - Liver: No focal mass.  Mild fatty infiltration of the liver.    - Gallbladder: No calcified gallstones.    - Bile Ducts: No evidence of intra or extra hepatic biliary ductal dilation.    - Spleen: Negative.    - Kidneys: Patchy areas of diminished enhancement in the mid right kidney.  Mild perinephric inflammatory stranding.  Findings suggest pyelonephritis on the right.  Recommend clinical correlation and follow-up.  No stone, soft tissue mass or  hydronephrosis bilaterally.    Left kidney is within normal limits.    - Adrenals: Unremarkable.    - Pancreas: No mass or peripancreatic fat stranding.    - Retroperitoneum:  No significant adenopathy.    - Vascular: No abdominal aortic aneurysm.    - Abdominal wall:  Small fat containing umbilical hernia.    Pelvis:    The uterus is present.  2.2 cm mass at the anterior margin of the uterus nonspecific though likely represents a uterine fibroid.  Recommend outpatient sonographic surveillance.  No adnexal mass.  Urinary bladder is nondistended.  Mild wall thickening is present.  Mild cystitis is a consideration.    Few subcentimeter right inguinal lymph nodes are present.    Bowel/Mesentery:    No evidence of bowel obstruction or inflammation.    Bones:  No acute osseous abnormality and no suspicious lytic or blastic lesion.  Severe disc space narrowing and vacuum disc phenomena at L4-5 and L3-4.  Bilateral foraminal narrowing at L4-5, L3-4, L2-3 and L5-S1.  Mild diffuse disc protrusion at L2-3, L3-4 and L4-5.  Moderate central canal narrowing.                                 Medical Decision Making:   History:   Old Medical Records: I decided to obtain old medical records.  Initial Assessment:   Patient is a 64-yo AAF with no pertinent PMHx who presents to the ED for urgent evaluation of RLQ abdominal pain x 2 days. Reports associated loss of appetite. Denies fever, N/V/D, dysuria or vaginal discharge.    PE reveals a non-toxic, febrile (100.5), well-appearing female in NAD. Tachycardic. Lungs CTAB. Abdomen soft, non-distended with TTP to the RLQ. +rebound. No guarding. Neuro exam nonfocal.  Differential Diagnosis:   DDx includes but is not limited to: sepsis, appendicitis, UTI, pyelonephritis, TOA, PID.  Clinical Tests:   Lab Tests: Ordered and Reviewed  Radiological Study: Ordered and Reviewed  Medical Tests: Ordered and Reviewed  Sepsis Perfusion Assessment: I attest, a sepsis perfusion exam was performed  within 6 hours of Septic Shock presentation, following fluid resuscitation.  ED Management:  Will obtain septic workup, give 30 cc/kg bolus. Tylenol and Toradol given for pain.    CBC notable for WBC count 18K, normal H&H. CMP notable for K 3.0, normal Cr, elevated anion gap 17. UA remarkable for 3+ OB, positive nitrite, 3+ leuks, >100 RBCs, >100 WBCs, mod bacteria. CXR without PNA. CT abd/pelvis notable for right pyelonephritis, mild wall thickening of the urinary bladder.     Patient given 2g Rocephin. Upon repeat assessment, patient states she feels comfortable. HR and Temp have normalized. Discussed findings with Dr. Hartley of , who recommends admission to their service for acute mgmt of sepsis 2/2 pyelo. Patient informed of results and treatment plan and is agreeable. I have discussed patient case with my supervisory physician, who is in agreement with my assessment and plan.    Other:   I have discussed this case with another health care provider.            Scribe Attestation:   Scribe #1: I performed the above scribed service and the documentation accurately describes the services I performed. I attest to the accuracy of the note.                          Clinical Impression:       ICD-10-CM ICD-9-CM   1. Pyelonephritis N12 590.80   2. Sepsis A41.9 038.9     995.91         Disposition:   Disposition: Admitted  Condition: Stable     ED Disposition Condition    Admit                         Shavon Oliva PA-C  05/10/20 1632       Shavon Oliva PA-C  05/19/20 1227

## 2020-05-10 NOTE — H&P
[facility] Hospital Medicine  History & Physical    Patient Name: Lindsey Rust  MRN: [id number]  Admission Date: 5/10/2020  Attending Physician: Jose Eduardo Lepe MD   Primary Care Provider: Primary Doctor No         Patient information was obtained from patient and ER records.     Subjective:     Principal Problem:<principal problem not specified>    Chief Complaint:   Chief Complaint   Patient presents with    Abdominal Pain     right lower abd pain x 2 days.  denies n/v/d or fever.   states frequency and urgency with urination.  pt states gallbladder is acting up.        HPI: This 64 y.o black woman came to ED with right flank pain and anorexia that started on Thursday afternoon and has persisted since then. She has been able to take in water without any N/V but hasn't eaten anything solid. No change in pain with eating and only movement and the end of urination makes the pain worse. She hasn't had any fever or cough. She denies any recent antibiotic use, no N/V/D. She is talking on the phone when I enter the room and arises easily to walk around and sit without any balance issues. I was called for admission for pyelonephritis and concern for sepsis as the patient's heart rate was still 106 after about 2L of IV were ordered for her. She has since only received about 250cc of the initial order with the rest to be given and her HR is 85 and regular, her fever has subsided after tylenol and she has a significant leukocytosis with left shift, negative covid results. CT shows cystitis and right pyelonephritis without hydronephrosis    Past Medical History:   Diagnosis Date    Sciatic pain        Past Surgical History:   Procedure Laterality Date    VAGINAL DELIVERY      X5       Review of patient's allergies indicates:  No Known Allergies    No current facility-administered medications on file prior to encounter.      Current Outpatient Medications on File Prior to Encounter   Medication Sig     diazePAM (VALIUM) 5 MG tablet Take 1 tablet (5 mg total) by mouth every 6 (six) hours as needed for Anxiety.    HYDROcodone-acetaminophen (NORCO) 5-325 mg per tablet Take 1 tablet by mouth every 4 (four) hours as needed for Pain.    naproxen (NAPROSYN) 500 MG tablet Take 1 tablet (500 mg total) by mouth 2 (two) times daily with meals.     Family History     Problem Relation (Age of Onset)    Asthma Daughter, Son    Diabetes Mother        Tobacco Use    Smoking status: Current Some Day Smoker     Packs/day: 0.50     Years: 20.00     Pack years: 10.00     Types: Cigarettes   Substance and Sexual Activity    Alcohol use: Yes     Comment: twice a week    Drug use: No    Sexual activity: Yes     Partners: Male     Birth control/protection: None     Review of Systems   Constitutional: Positive for appetite change. Negative for chills and fever.   HENT: Negative for sore throat and trouble swallowing.    Respiratory: Negative for cough, shortness of breath and wheezing.    Cardiovascular: Negative for chest pain and palpitations.   Gastrointestinal: Positive for abdominal pain. Negative for diarrhea, nausea and vomiting.   Genitourinary: Negative for dysuria.   Musculoskeletal: Positive for back pain. Negative for joint swelling, myalgias, neck pain and neck stiffness.   Skin: Negative for rash.   Neurological: Negative for dizziness, syncope and light-headedness.     Objective:     Vital Signs (Most Recent):  Temp: 98.8 °F (37.1 °C) (05/10/20 1615)  Pulse: 94 (05/10/20 1615)  Resp: 18 (05/10/20 1615)  BP: 117/61 (05/10/20 1615)  SpO2: 95 % (05/10/20 1615) Vital Signs (24h Range):  Temp:  [98.8 °F (37.1 °C)-100.5 °F (38.1 °C)] 98.8 °F (37.1 °C)  Pulse:  [] 94  Resp:  [18] 18  SpO2:  [95 %-96 %] 95 %  BP: (117-126)/(61-66) 117/61     Weight: 65.8 kg (145 lb)  Body mass index is 22.71 kg/m².    Physical Exam   Constitutional: She is oriented to person, place, and time. She appears well-developed. No distress.    Eyes: Conjunctivae and EOM are normal.   Neck: Neck supple. No JVD present.   Cardiovascular: Normal rate, regular rhythm and normal heart sounds.   Pulmonary/Chest: Effort normal and breath sounds normal. She has no wheezes. She has no rales.   Abdominal: Soft. She exhibits no distension. There is no tenderness. There is no rebound.   Musculoskeletal: She exhibits no deformity.   Neurological: She is alert and oriented to person, place, and time.   Skin: Skin is warm and dry. No rash noted.   Nursing note and vitals reviewed.        CRANIAL NERVES     CN III, IV, VI   Extraocular motions are normal.        Significant Labs: All pertinent labs within the past 24 hours have been reviewed.    Significant Imaging: I have reviewed and interpreted all pertinent imaging results/findings within the past 24 hours.    Assessment/Plan:     Acute pyelonephritis  Continue CTX and IV fluids, monitor output. Pain and nausea control as needed    Hypokalemia  Likely due to poor intake, replete      Alcohol use  Last use 5 days ago, counseled on cessation        VTE Risk Mitigation (From admission, onward)         Ordered     IP VTE LOW RISK PATIENT  Once      05/10/20 1659     Place JOVON hose  Until discontinued      05/10/20 1659     Place sequential compression device  Until discontinued      05/10/20 1659     Place sequential compression device  Until discontinued      05/10/20 1659                   El Hartley MD  Department of Hospital Medicine   [facility]

## 2020-05-10 NOTE — ED TRIAGE NOTES
Patient c/o New RLQ abdominal pain began Friday morning, sent by PCP to ED due to pain level of 8/10. Reports not eating x 2 days, generalized weakness, last BM 05/09/20 states it was a normal bowel movement     Denies N/V/D, pain radiating to other areas, sob, chest pain, fever, chills.     AAO x 4

## 2020-05-10 NOTE — SUBJECTIVE & OBJECTIVE
Past Medical History:   Diagnosis Date    Sciatic pain        Past Surgical History:   Procedure Laterality Date    VAGINAL DELIVERY      X5       Review of patient's allergies indicates:  No Known Allergies    No current facility-administered medications on file prior to encounter.      Current Outpatient Medications on File Prior to Encounter   Medication Sig    diazePAM (VALIUM) 5 MG tablet Take 1 tablet (5 mg total) by mouth every 6 (six) hours as needed for Anxiety.    HYDROcodone-acetaminophen (NORCO) 5-325 mg per tablet Take 1 tablet by mouth every 4 (four) hours as needed for Pain.    naproxen (NAPROSYN) 500 MG tablet Take 1 tablet (500 mg total) by mouth 2 (two) times daily with meals.     Family History     Problem Relation (Age of Onset)    Asthma Daughter, Son    Diabetes Mother        Tobacco Use    Smoking status: Current Some Day Smoker     Packs/day: 0.50     Years: 20.00     Pack years: 10.00     Types: Cigarettes   Substance and Sexual Activity    Alcohol use: Yes     Comment: twice a week    Drug use: No    Sexual activity: Yes     Partners: Male     Birth control/protection: None     Review of Systems   Constitutional: Positive for appetite change. Negative for chills and fever.   HENT: Negative for sore throat and trouble swallowing.    Respiratory: Negative for cough, shortness of breath and wheezing.    Cardiovascular: Negative for chest pain and palpitations.   Gastrointestinal: Positive for abdominal pain. Negative for diarrhea, nausea and vomiting.   Genitourinary: Negative for dysuria.   Musculoskeletal: Positive for back pain. Negative for joint swelling, myalgias, neck pain and neck stiffness.   Skin: Negative for rash.   Neurological: Negative for dizziness, syncope and light-headedness.     Objective:     Vital Signs (Most Recent):  Temp: 98.8 °F (37.1 °C) (05/10/20 1615)  Pulse: 94 (05/10/20 1615)  Resp: 18 (05/10/20 1615)  BP: 117/61 (05/10/20 1615)  SpO2: 95 % (05/10/20  1615) Vital Signs (24h Range):  Temp:  [98.8 °F (37.1 °C)-100.5 °F (38.1 °C)] 98.8 °F (37.1 °C)  Pulse:  [] 94  Resp:  [18] 18  SpO2:  [95 %-96 %] 95 %  BP: (117-126)/(61-66) 117/61     Weight: 65.8 kg (145 lb)  Body mass index is 22.71 kg/m².    Physical Exam   Constitutional: She is oriented to person, place, and time. She appears well-developed. No distress.   Eyes: Conjunctivae and EOM are normal.   Neck: Neck supple. No JVD present.   Cardiovascular: Normal rate, regular rhythm and normal heart sounds.   Pulmonary/Chest: Effort normal and breath sounds normal. She has no wheezes. She has no rales.   Abdominal: Soft. She exhibits no distension. There is no tenderness. There is no rebound.   Musculoskeletal: She exhibits no deformity.   Neurological: She is alert and oriented to person, place, and time.   Skin: Skin is warm and dry. No rash noted.   Nursing note and vitals reviewed.        CRANIAL NERVES     CN III, IV, VI   Extraocular motions are normal.        Significant Labs: All pertinent labs within the past 24 hours have been reviewed.    Significant Imaging: I have reviewed and interpreted all pertinent imaging results/findings within the past 24 hours.

## 2020-05-11 VITALS
HEIGHT: 67 IN | DIASTOLIC BLOOD PRESSURE: 60 MMHG | SYSTOLIC BLOOD PRESSURE: 129 MMHG | BODY MASS INDEX: 22.98 KG/M2 | OXYGEN SATURATION: 95 % | TEMPERATURE: 98 F | RESPIRATION RATE: 19 BRPM | HEART RATE: 76 BPM | WEIGHT: 146.38 LBS

## 2020-05-11 LAB
ALBUMIN SERPL BCP-MCNC: 2.5 G/DL (ref 3.5–5.2)
ALP SERPL-CCNC: 70 U/L (ref 55–135)
ALT SERPL W/O P-5'-P-CCNC: 16 U/L (ref 10–44)
ANION GAP SERPL CALC-SCNC: 10 MMOL/L (ref 8–16)
AST SERPL-CCNC: 12 U/L (ref 10–40)
BASOPHILS # BLD AUTO: 0.02 K/UL (ref 0–0.2)
BASOPHILS NFR BLD: 0.2 % (ref 0–1.9)
BILIRUB SERPL-MCNC: 0.4 MG/DL (ref 0.1–1)
BUN SERPL-MCNC: 8 MG/DL (ref 8–23)
CALCIUM SERPL-MCNC: 7.8 MG/DL (ref 8.7–10.5)
CHLORIDE SERPL-SCNC: 104 MMOL/L (ref 95–110)
CO2 SERPL-SCNC: 23 MMOL/L (ref 23–29)
CREAT SERPL-MCNC: 0.9 MG/DL (ref 0.5–1.4)
DIFFERENTIAL METHOD: ABNORMAL
EOSINOPHIL # BLD AUTO: 0 K/UL (ref 0–0.5)
EOSINOPHIL NFR BLD: 0.1 % (ref 0–8)
ERYTHROCYTE [DISTWIDTH] IN BLOOD BY AUTOMATED COUNT: 14.4 % (ref 11.5–14.5)
EST. GFR  (AFRICAN AMERICAN): >60 ML/MIN/1.73 M^2
EST. GFR  (NON AFRICAN AMERICAN): >60 ML/MIN/1.73 M^2
ESTIMATED AVG GLUCOSE: 100 MG/DL (ref 68–131)
GLUCOSE SERPL-MCNC: 96 MG/DL (ref 70–110)
HBA1C MFR BLD HPLC: 5.1 % (ref 4–5.6)
HCT VFR BLD AUTO: 37.9 % (ref 37–48.5)
HGB BLD-MCNC: 12.3 G/DL (ref 12–16)
IMM GRANULOCYTES # BLD AUTO: 0.04 K/UL (ref 0–0.04)
IMM GRANULOCYTES NFR BLD AUTO: 0.4 % (ref 0–0.5)
LYMPHOCYTES # BLD AUTO: 1 K/UL (ref 1–4.8)
LYMPHOCYTES NFR BLD: 8.9 % (ref 18–48)
MCH RBC QN AUTO: 31.3 PG (ref 27–31)
MCHC RBC AUTO-ENTMCNC: 32.5 G/DL (ref 32–36)
MCV RBC AUTO: 96 FL (ref 82–98)
MONOCYTES # BLD AUTO: 0.8 K/UL (ref 0.3–1)
MONOCYTES NFR BLD: 7.2 % (ref 4–15)
NEUTROPHILS # BLD AUTO: 9.5 K/UL (ref 1.8–7.7)
NEUTROPHILS NFR BLD: 83.2 % (ref 38–73)
NRBC BLD-RTO: 0 /100 WBC
PLATELET # BLD AUTO: 242 K/UL (ref 150–350)
PMV BLD AUTO: 10.7 FL (ref 9.2–12.9)
POCT GLUCOSE: 95 MG/DL (ref 70–110)
POCT GLUCOSE: 96 MG/DL (ref 70–110)
POTASSIUM SERPL-SCNC: 2.8 MMOL/L (ref 3.5–5.1)
PROT SERPL-MCNC: 6.4 G/DL (ref 6–8.4)
RBC # BLD AUTO: 3.93 M/UL (ref 4–5.4)
SODIUM SERPL-SCNC: 137 MMOL/L (ref 136–145)
WBC # BLD AUTO: 11.4 K/UL (ref 3.9–12.7)

## 2020-05-11 PROCEDURE — 85025 COMPLETE CBC W/AUTO DIFF WBC: CPT

## 2020-05-11 PROCEDURE — 63600175 PHARM REV CODE 636 W HCPCS: Performed by: INTERNAL MEDICINE

## 2020-05-11 PROCEDURE — 80053 COMPREHEN METABOLIC PANEL: CPT

## 2020-05-11 PROCEDURE — 96361 HYDRATE IV INFUSION ADD-ON: CPT

## 2020-05-11 PROCEDURE — 96376 TX/PRO/DX INJ SAME DRUG ADON: CPT

## 2020-05-11 PROCEDURE — 25000003 PHARM REV CODE 250: Performed by: INTERNAL MEDICINE

## 2020-05-11 PROCEDURE — 36415 COLL VENOUS BLD VENIPUNCTURE: CPT

## 2020-05-11 PROCEDURE — G0378 HOSPITAL OBSERVATION PER HR: HCPCS

## 2020-05-11 RX ORDER — ONDANSETRON 4 MG/1
4 TABLET, ORALLY DISINTEGRATING ORAL EVERY 6 HOURS PRN
Qty: 12 TABLET | Refills: 0 | Status: ON HOLD | OUTPATIENT
Start: 2020-05-11 | End: 2023-11-29 | Stop reason: HOSPADM

## 2020-05-11 RX ORDER — LEVOFLOXACIN 750 MG/1
750 TABLET ORAL DAILY
Qty: 5 TABLET | Refills: 0 | Status: ON HOLD | OUTPATIENT
Start: 2020-05-11 | End: 2023-12-01 | Stop reason: HOSPADM

## 2020-05-11 RX ADMIN — HYDROCODONE BITARTRATE AND ACETAMINOPHEN 2 TABLET: 5; 325 TABLET ORAL at 06:05

## 2020-05-11 RX ADMIN — CEFTRIAXONE 1 G: 1 INJECTION, SOLUTION INTRAVENOUS at 02:05

## 2020-05-11 RX ADMIN — SODIUM CHLORIDE AND POTASSIUM CHLORIDE: 4.5; 1.49 INJECTION, SOLUTION INTRAVENOUS at 04:05

## 2020-05-11 NOTE — PLAN OF CARE
05/11/20 1124   Post-Acute Status   Post-Acute Authorization Other  (Home )   Other Status No Post-Acute Service Needs   Discharge Delays None known at this time      depression

## 2020-05-11 NOTE — NURSING
Patient discharged per MD order.  Peripheral IV removed.  Catheter tip intact.  No distress noted.  Discharge instructions explained, patient verbalized understanding of plan of care after discharge.  AVS given to patient and placed in Blue Folder with patient verbalizing understanding of information.  VSS.  Afebrile.  No complaints of pain, N/V, diarrhea, or SOB.   Patient provided opportunity to ask any further questions related to discharge care.  Patient left with belongings to 4th floor parking garage exit via wheelchair per transport.

## 2020-05-11 NOTE — PLAN OF CARE
Patient alert and oriented x4. Respirations even and unlabored. No distress noted. Skin warm and dry. Iv fluids infusing with no complications noted. Iv antibiotics administered as ordered. Patient complains of pain to flank area. Pain medications given as needed. Blood glucose monitored. Insulin given per sliding scale. Patient has no complaints at this time. Instructed to call for any needs. Bed in lowest position. Call bell within reach. Will continue to monitor.     Problem: Adult Inpatient Plan of Care  Goal: Plan of Care Review  Outcome: Ongoing, Progressing  Goal: Patient-Specific Goal (Individualization)  Outcome: Ongoing, Progressing  Goal: Absence of Hospital-Acquired Illness or Injury  Outcome: Ongoing, Progressing     Problem: Fall Injury Risk  Goal: Absence of Fall and Fall-Related Injury  Outcome: Ongoing, Progressing  Intervention: Identify and Manage Contributors to Fall Injury Risk  Flowsheets (Taken 5/11/2020 0438)  Self-Care Promotion: independence encouraged; BADL personal objects within reach  Medication Review/Management: medications reviewed; high risk medications identified     Problem: Pain Acute  Goal: Optimal Pain Control  Intervention: Develop Pain Management Plan  Flowsheets (Taken 5/11/2020 1592)  Pain Management Interventions: care clustered; pain management plan reviewed with patient/caregiver; pillow support provided; position adjusted

## 2020-05-11 NOTE — DISCHARGE SUMMARY
[facility] Hospital Medicine  Discharge Summary      Patient Name: Lindsey Rust  MRN: [id number]  Admission Date: 5/10/2020  Hospital Length of Stay: 1 days  Discharge Date and Time:  05/11/2020 9:45 AM  Attending Physician: El Hartley MD   Discharging Provider: El Hartley MD  Primary Care Provider: Primary Doctor No      HPI:   This 64 y.o black woman came to ED with right flank pain and anorexia that started on Thursday afternoon and has persisted since then. She has been able to take in water without any N/V but hasn't eaten anything solid. No change in pain with eating and only movement and the end of urination makes the pain worse. She hasn't had any fever or cough. She denies any recent antibiotic use, no N/V/D. She is talking on the phone when I enter the room and arises easily to walk around and sit without any balance issues. I was called for admission for pyelonephritis and concern for sepsis as the patient's heart rate was still 106 after about 2L of IV were ordered for her. She has since only received about 250cc of the initial order with the rest to be given and her HR is 85 and regular, her fever has subsided after tylenol and she has a significant leukocytosis with left shift, negative covid results. CT shows cystitis and right pyelonephritis without hydronephrosis    * No surgery found *      Hospital Course:     Patient was placed in observation for pyelonephritis and did well with IV fluids, ceftriaxone and pain control. Her white count normalized and renal function remained normal. Was able to maintain PO intake and had no issue urinating. She was placed on levaquin for completion of therapy with return precautions for worsening symptoms and to follow up with her outpatient provider as needed.    No new Assessment & Plan notes have been filed under this hospital service since the last note was generated.  Service: Hospital Medicine    Final Active Diagnoses:    Diagnosis Date  Noted POA    PRINCIPAL PROBLEM:  Acute pyelonephritis [N10] 05/10/2020 Yes    Hypokalemia [E87.6] 05/10/2020 Yes    Alcohol use [Z72.89] 05/10/2020 Yes      Problems Resolved During this Admission:       Discharged Condition: good    Disposition: Home or Self Care    Follow Up:    Patient Instructions:      Diet Adult Regular     Notify your health care provider if you experience any of the following:  temperature >100.4     Notify your health care provider if you experience any of the following:  persistent nausea and vomiting or diarrhea     Notify your health care provider if you experience any of the following:  severe uncontrolled pain     Activity as tolerated       Significant Diagnostic Studies: Urine culture pending, CT with cystitis and right developing pyelonephritis    Pending Diagnostic Studies:     None         Medications:  Reconciled Home Medications:      Medication List      START taking these medications    levoFLOXacin 750 MG tablet  Commonly known as:  LEVAQUIN  Take 1 tablet (750 mg total) by mouth once daily.     ondansetron 4 MG Tbdl  Commonly known as:  ZOFRAN-ODT  Take 1 tablet (4 mg total) by mouth every 6 (six) hours as needed.        CONTINUE taking these medications    diazePAM 5 MG tablet  Commonly known as:  VALIUM  Take 1 tablet (5 mg total) by mouth every 6 (six) hours as needed for Anxiety.     HYDROcodone-acetaminophen 5-325 mg per tablet  Commonly known as:  NORCO  Take 1 tablet by mouth every 4 (four) hours as needed for Pain.            Indwelling Lines/Drains at time of discharge:   Lines/Drains/Airways     None                 Time spent on the discharge of patient: 32 minutes  Patient was seen and examined on the date of discharge and determined to be suitable for discharge.         El Hartley MD  Department of Hospital Medicine  [facility]

## 2020-05-11 NOTE — PLAN OF CARE
05/11/20 1019   Discharge Assessment   Assessment Type Discharge Planning Assessment   Confirmed/corrected address and phone number on facesheet? Yes   Assessment information obtained from? Patient   Communicated expected length of stay with patient/caregiver no   Prior to hospitilization cognitive status: Alert/Oriented   Prior to hospitalization functional status: Independent   Current cognitive status: Alert/Oriented   Current Functional Status: Independent   Facility Arrived From: Home    Lives With spouse  (Kate Spouse [phone])   Able to Return to Prior Arrangements yes   Is patient able to care for self after discharge? Yes   Who are your caregiver(s) and their phone number(s)? Zeus: Spouse [phone]   Patient's perception of discharge disposition home or selfcare   Readmission Within the Last 30 Days no previous admission in last 30 days   Patient currently being followed by outpatient case management? No   Patient currently receives any other outside agency services? No   Equipment Currently Used at Home none   Do you have any problems affording any of your prescribed medications? No   Is the patient taking medications as prescribed? yes   Does the patient have transportation home? Yes   Transportation Anticipated family or friend will provide   Does the patient receive services at the [facility]mad[facility] Clinic? No   Discharge Plan A Home with family   DME Needed Upon Discharge  none   Patient/Family in Agreement with Plan yes       [id number] - [place], [place] - [address]  [address] [place] [address]  Phone: [phone] Fax: [phone]    SHARON contacted pt to complete discharge assessment. Prior to completing assessment, pt confirmed address, phone number and PCP. Pt stated that her spouse, Zeus [phone], will care for pt full time when discharged. Pt stated that her PCP is Dr. Keri Medrano

## 2020-05-11 NOTE — PLAN OF CARE
"   05/11/20 1037   Final Note   Assessment Type Final Discharge Note   Anticipated Discharge Disposition Home   What phone number can be called within the next 1-3 days to see how you are doing after discharge? [phone]   Hospital Follow Up  Appt(s) scheduled? Yes  (Tuesday, May 19, 2020 at 10:00AM with Dr. Judd Medrano)   Discharge plans and expectations educations in teach back method with documentation complete? Yes   Right Care Referral Info   Post Acute Recommendation No Care   Post-Acute Status   Post-Acute Authorization   (Home)   Discharge Delays None known at this time       SW contacted pt to complete final note and provid pt with educational information on pyelonephritis. SW instructed pt to call the doctor if experiencing symptoms that may indicate a medical emergency: CALL 911 if signs and symptoms worsen. SW asked pt to provide SW with two signs and symptoms recently discussed.  Pt stated "call 911 and return to [facility] if I have trouble breathing or rapid heart rate". Reminded pt. things he will be responsible for managing  their healthcare at home: getting Rx filled, attending follow up appointments, and taking medication as prescribed were discussed. Teach back method used.     Follow up appt made: Tuesday, May 19, 2020 at 10:00AM with Dr. Judd Medrano     Pt's nurse, Radha, was notified that pt was good to go from CM standpoint.   "

## 2020-05-11 NOTE — PLAN OF CARE
Problem: Adult Inpatient Plan of Care  Goal: Plan of Care Review  Outcome: Met  Goal: Patient-Specific Goal (Individualization)  Outcome: Met  Goal: Absence of Hospital-Acquired Illness or Injury  Outcome: Met  Intervention: Identify and Manage Fall Risk  Flowsheets (Taken 5/11/2020 1155)  Safety Promotion/Fall Prevention: assistive device/personal item within reach; medications reviewed; Fall Risk reviewed with patient/family; side rails raised x 2; nonskid shoes/socks when out of bed; room near unit station  Intervention: Prevent VTE (venous thromboembolism)  Flowsheets (Taken 5/11/2020 1155)  VTE Prevention/Management: remove, assess skin and reapply sequential compression device; ambulation promoted; fluids promoted  Goal: Optimal Comfort and Wellbeing  Outcome: Met  Intervention: Provide Person-Centered Care  Flowsheets (Taken 5/11/2020 1155)  Trust Relationship/Rapport: care explained; choices provided; questions encouraged; questions answered  Goal: Readiness for Transition of Care  Outcome: Met  Goal: Rounds/Family Conference  Outcome: Met     Problem: Fall Injury Risk  Goal: Absence of Fall and Fall-Related Injury  Outcome: Met  Intervention: Identify and Manage Contributors to Fall Injury Risk  Flowsheets (Taken 5/11/2020 1155)  Self-Care Promotion: independence encouraged; BADL personal objects within reach; BADL personal routines maintained  Medication Review/Management: medications reviewed  Intervention: Promote Injury-Free Environment  Flowsheets (Taken 5/11/2020 1155)  Safety Promotion/Fall Prevention: assistive device/personal item within reach; medications reviewed; Fall Risk reviewed with patient/family; side rails raised x 2; nonskid shoes/socks when out of bed; room near unit station     Problem: Pain Acute  Goal: Optimal Pain Control  Outcome: Met  Intervention: Develop Pain Management Plan  Flowsheets (Taken 5/11/2020 1155)  Pain Management Interventions: pillow support  provided  Intervention: Prevent or Manage Pain  Flowsheets (Taken 5/11/2020 1155)  Sleep/Rest Enhancement: awakenings minimized  Sensory Stimulation Regulation: care clustered  Intervention: Optimize Psychosocial Wellbeing  Flowsheets (Taken 5/11/2020 1155)  Supportive Measures: active listening utilized

## 2020-05-11 NOTE — PROGRESS NOTES
WRITTEN HEALTHCARE DISCHARGE INFORMATION     Things that YOU are RESPONSIBLE for to Manage Your Care At Home:    1. Getting your prescriptions filled.  2. Taking you medications as directed. DO NOT MISS ANY DOSES!  3. Going to your follow-up doctor appointments. This is important because it allows the doctor to monitor your progress and to determine if any changes need to be made to your treatment plan.    If you are unable to make your follow up appointments, please call the number listed and reschedule this appointment.     ____________HELP AT HOME____________________    Experiencing any SIGNS or SYMPTOMS: YOU CAN    Schedule a same day appopintment with your Primary Care Doctor or  you can call [facility] On Call Nurse Care Line for 24/7 assistance at [phone]    If you are experience any signs or symptoms that have become severe, Call 911 and come to your nearest Emergency Room.    Thank you for choosing [facility] and allowing us to care for you.   From your care management team: Yane CANTRELL [facility] [place] [phone]    You should receive a call from [facility] Discharge Department within 48-72 hours to help manage your care after discharge. Please try to make sure that you answer your phone for this important phone call.  Follow-up Information     Judd Medrano MD On 5/19/2020.    Specialty:  Internal Medicine  Why:  Hospital follow up, Tuesday, May 19, 2020 at 10:00AM with Dr. Judd Medrano   Contact information:  [facility] [facility] [address] [phone]

## 2020-05-12 LAB — BACTERIA UR CULT: ABNORMAL

## 2020-05-14 LAB
BACTERIA BLD CULT: NORMAL
BACTERIA BLD CULT: NORMAL

## 2021-08-27 ENCOUNTER — HOSPITAL ENCOUNTER (EMERGENCY)
Facility: HOSPITAL | Age: 66
Discharge: HOME | End: 2021-08-27
Attending: EMERGENCY MEDICINE
Payer: MEDICAID

## 2021-08-27 VITALS
HEART RATE: 94 BPM | RESPIRATION RATE: 18 BRPM | DIASTOLIC BLOOD PRESSURE: 79 MMHG | WEIGHT: 151 LBS | SYSTOLIC BLOOD PRESSURE: 174 MMHG | HEIGHT: 67 IN | OXYGEN SATURATION: 96 % | TEMPERATURE: 98 F | BODY MASS INDEX: 23.7 KG/M2

## 2021-08-27 DIAGNOSIS — M54.32 SCIATICA, LEFT SIDE: Primary | ICD-10-CM

## 2021-08-27 PROCEDURE — 99284 EMERGENCY DEPT VISIT MOD MDM: CPT | Mod: 25

## 2021-08-27 PROCEDURE — 63600175 PHARM REV CODE 636 W HCPCS: Performed by: NURSE PRACTITIONER

## 2021-08-27 PROCEDURE — 96372 THER/PROPH/DIAG INJ SC/IM: CPT

## 2021-08-27 PROCEDURE — 25000003 PHARM REV CODE 250: Performed by: NURSE PRACTITIONER

## 2021-08-27 RX ORDER — LIDOCAINE 50 MG/G
1 PATCH TOPICAL
Status: DISCONTINUED | OUTPATIENT
Start: 2021-08-27 | End: 2021-08-27 | Stop reason: HOSPADM

## 2021-08-27 RX ORDER — CYCLOBENZAPRINE HCL 10 MG
10 TABLET ORAL
Status: COMPLETED | OUTPATIENT
Start: 2021-08-27 | End: 2021-08-27

## 2021-08-27 RX ORDER — BETAMETHASONE SODIUM PHOSPHATE AND BETAMETHASONE ACETATE 3; 3 MG/ML; MG/ML
9 INJECTION, SUSPENSION INTRA-ARTICULAR; INTRALESIONAL; INTRAMUSCULAR; SOFT TISSUE
Status: COMPLETED | OUTPATIENT
Start: 2021-08-27 | End: 2021-08-27

## 2021-08-27 RX ORDER — LIDOCAINE 50 MG/G
1 PATCH TOPICAL DAILY
Qty: 15 PATCH | Refills: 0 | Status: ON HOLD | OUTPATIENT
Start: 2021-08-27 | End: 2023-11-29 | Stop reason: HOSPADM

## 2021-08-27 RX ORDER — METHOCARBAMOL 500 MG/1
500 TABLET, FILM COATED ORAL 3 TIMES DAILY PRN
Qty: 15 TABLET | Refills: 0 | Status: SHIPPED | OUTPATIENT
Start: 2021-08-27 | End: 2021-09-01

## 2021-08-27 RX ORDER — IBUPROFEN 600 MG/1
600 TABLET ORAL EVERY 6 HOURS PRN
Qty: 20 TABLET | Refills: 0 | Status: ON HOLD | OUTPATIENT
Start: 2021-08-27 | End: 2023-12-01 | Stop reason: SDUPTHER

## 2021-08-27 RX ADMIN — BETAMETHASONE SODIUM PHOSPHATE AND BETAMETHASONE ACETATE 9 MG: 3; 3 INJECTION, SUSPENSION INTRA-ARTICULAR; INTRALESIONAL; INTRAMUSCULAR at 01:08

## 2021-08-27 RX ADMIN — LIDOCAINE 1 PATCH: 50 PATCH TOPICAL at 01:08

## 2021-08-27 RX ADMIN — CYCLOBENZAPRINE 10 MG: 10 TABLET, FILM COATED ORAL at 01:08

## 2023-11-29 ENCOUNTER — HOSPITAL ENCOUNTER (INPATIENT)
Facility: HOSPITAL | Age: 68
LOS: 2 days | Discharge: HOME | DRG: 193 | End: 2023-12-01
Attending: EMERGENCY MEDICINE | Admitting: PEDIATRICS
Payer: MEDICARE

## 2023-11-29 DIAGNOSIS — M79.89 LEG SWELLING: ICD-10-CM

## 2023-11-29 DIAGNOSIS — R09.02 HYPOXIA: ICD-10-CM

## 2023-11-29 DIAGNOSIS — R07.9 CHEST PAIN: ICD-10-CM

## 2023-11-29 DIAGNOSIS — Z72.0 TOBACCO ABUSE: ICD-10-CM

## 2023-11-29 DIAGNOSIS — R06.02 SOB (SHORTNESS OF BREATH): ICD-10-CM

## 2023-11-29 DIAGNOSIS — J18.9 PNEUMONIA OF LEFT LOWER LOBE DUE TO INFECTIOUS ORGANISM: Primary | ICD-10-CM

## 2023-11-29 PROBLEM — J96.01 ACUTE HYPOXIC RESPIRATORY FAILURE: Status: ACTIVE | Noted: 2023-11-29

## 2023-11-29 LAB
ALBUMIN SERPL-MCNC: 4 G/DL (ref 3.3–5.5)
ALLENS TEST: ABNORMAL
ALP SERPL-CCNC: 76 U/L (ref 42–141)
BILIRUB SERPL-MCNC: 0.8 MG/DL (ref 0.2–1.6)
BUN SERPL-MCNC: 8 MG/DL (ref 7–22)
CALCIUM SERPL-MCNC: 9.8 MG/DL (ref 8–10.3)
CHLORIDE SERPL-SCNC: 102 MMOL/L (ref 98–108)
CREAT SERPL-MCNC: 0.9 MG/DL (ref 0.6–1.2)
CTP QC/QA: YES
DELSYS: ABNORMAL
GLUCOSE SERPL-MCNC: 113 MG/DL (ref 73–118)
HCO3 UR-SCNC: 28 MMOL/L (ref 24–28)
INFLUENZA A ANTIGEN, POC: NEGATIVE
INFLUENZA B ANTIGEN, POC: NEGATIVE
LDH SERPL L TO P-CCNC: 0.7 MMOL/L (ref 0.36–1.25)
PCO2 BLDA: 33.1 MMHG (ref 35–45)
PH SMN: 7.54 [PH] (ref 7.35–7.45)
PO2 BLDA: 65 MMHG (ref 80–100)
POC ALT (SGPT): 59 U/L (ref 10–47)
POC AST (SGOT): 39 U/L (ref 11–38)
POC B-TYPE NATRIURETIC PEPTIDE: <5 PG/ML (ref 0–100)
POC BE: 6 MMOL/L
POC CARDIAC TROPONIN I: 0.02 NG/ML (ref 0–0.08)
POC PTINR: 1.5 (ref 0.9–1.2)
POC PTWBT: 17.5 SEC (ref 9.7–14.3)
POC SATURATED O2: 95 % (ref 95–100)
POC TCO2: 29 MMOL/L (ref 23–27)
POC TCO2: 32 MMOL/L (ref 18–33)
POTASSIUM BLD-SCNC: 3.6 MMOL/L (ref 3.6–5.1)
PROTEIN, POC: 9 G/DL (ref 6.4–8.1)
SAMPLE: ABNORMAL
SAMPLE: ABNORMAL
SAMPLE: NORMAL
SARS-COV-2 RDRP RESP QL NAA+PROBE: NEGATIVE
SITE: ABNORMAL
SODIUM BLD-SCNC: 142 MMOL/L (ref 128–145)

## 2023-11-29 PROCEDURE — 93010 EKG 12-LEAD: ICD-10-PCS | Mod: HCNC,,, | Performed by: INTERNAL MEDICINE

## 2023-11-29 PROCEDURE — 82803 BLOOD GASES ANY COMBINATION: CPT | Mod: HCNC,ER

## 2023-11-29 PROCEDURE — 63600175 PHARM REV CODE 636 W HCPCS: Mod: HCNC | Performed by: PEDIATRICS

## 2023-11-29 PROCEDURE — 25500020 PHARM REV CODE 255: Mod: HCNC,ER | Performed by: EMERGENCY MEDICINE

## 2023-11-29 PROCEDURE — 36600 WITHDRAWAL OF ARTERIAL BLOOD: CPT | Mod: HCNC,ER

## 2023-11-29 PROCEDURE — 93005 ELECTROCARDIOGRAM TRACING: CPT | Mod: HCNC,ER

## 2023-11-29 PROCEDURE — 63600175 PHARM REV CODE 636 W HCPCS: Mod: HCNC,ER | Performed by: EMERGENCY MEDICINE

## 2023-11-29 PROCEDURE — 11000001 HC ACUTE MED/SURG PRIVATE ROOM: Mod: HCNC

## 2023-11-29 PROCEDURE — 94640 AIRWAY INHALATION TREATMENT: CPT | Mod: HCNC,ER

## 2023-11-29 PROCEDURE — 94640 AIRWAY INHALATION TREATMENT: CPT | Mod: HCNC

## 2023-11-29 PROCEDURE — 25000242 PHARM REV CODE 250 ALT 637 W/ HCPCS: Mod: HCNC | Performed by: PEDIATRICS

## 2023-11-29 PROCEDURE — 37799 UNLISTED PX VASCULAR SURGERY: CPT | Mod: HCNC,ER

## 2023-11-29 PROCEDURE — 96361 HYDRATE IV INFUSION ADD-ON: CPT | Mod: HCNC,ER

## 2023-11-29 PROCEDURE — 25000003 PHARM REV CODE 250: Mod: HCNC,ER | Performed by: EMERGENCY MEDICINE

## 2023-11-29 PROCEDURE — 87635 SARS-COV-2 COVID-19 AMP PRB: CPT | Mod: HCNC,ER | Performed by: EMERGENCY MEDICINE

## 2023-11-29 PROCEDURE — 93010 ELECTROCARDIOGRAM REPORT: CPT | Mod: HCNC,,, | Performed by: INTERNAL MEDICINE

## 2023-11-29 PROCEDURE — 96365 THER/PROPH/DIAG IV INF INIT: CPT | Mod: HCNC,ER

## 2023-11-29 PROCEDURE — 25000242 PHARM REV CODE 250 ALT 637 W/ HCPCS: Mod: HCNC,ER | Performed by: EMERGENCY MEDICINE

## 2023-11-29 PROCEDURE — 96375 TX/PRO/DX INJ NEW DRUG ADDON: CPT | Mod: HCNC,ER

## 2023-11-29 PROCEDURE — 99285 EMERGENCY DEPT VISIT HI MDM: CPT | Mod: 25,HCNC,ER

## 2023-11-29 PROCEDURE — 96374 THER/PROPH/DIAG INJ IV PUSH: CPT | Mod: 59,HCNC,ER

## 2023-11-29 PROCEDURE — 87040 BLOOD CULTURE FOR BACTERIA: CPT | Mod: HCNC | Performed by: EMERGENCY MEDICINE

## 2023-11-29 RX ORDER — SODIUM CHLORIDE 0.9 % (FLUSH) 0.9 %
10 SYRINGE (ML) INJECTION EVERY 12 HOURS PRN
Status: DISCONTINUED | OUTPATIENT
Start: 2023-11-29 | End: 2023-12-01 | Stop reason: HOSPADM

## 2023-11-29 RX ORDER — SODIUM CHLORIDE AND POTASSIUM CHLORIDE 150; 900 MG/100ML; MG/100ML
INJECTION, SOLUTION INTRAVENOUS CONTINUOUS
Status: DISCONTINUED | OUTPATIENT
Start: 2023-11-29 | End: 2023-11-30

## 2023-11-29 RX ORDER — ACETAMINOPHEN 325 MG/1
650 TABLET ORAL EVERY 4 HOURS PRN
Status: DISCONTINUED | OUTPATIENT
Start: 2023-11-29 | End: 2023-12-01 | Stop reason: HOSPADM

## 2023-11-29 RX ORDER — AZITHROMYCIN 250 MG/1
250 TABLET, FILM COATED ORAL DAILY
Status: DISCONTINUED | OUTPATIENT
Start: 2023-11-30 | End: 2023-12-01 | Stop reason: HOSPADM

## 2023-11-29 RX ORDER — IPRATROPIUM BROMIDE AND ALBUTEROL SULFATE 2.5; .5 MG/3ML; MG/3ML
3 SOLUTION RESPIRATORY (INHALATION)
Status: DISCONTINUED | OUTPATIENT
Start: 2023-11-30 | End: 2023-12-01 | Stop reason: HOSPADM

## 2023-11-29 RX ORDER — DEXAMETHASONE SODIUM PHOSPHATE 4 MG/ML
8 INJECTION, SOLUTION INTRA-ARTICULAR; INTRALESIONAL; INTRAMUSCULAR; INTRAVENOUS; SOFT TISSUE
Status: COMPLETED | OUTPATIENT
Start: 2023-11-29 | End: 2023-11-29

## 2023-11-29 RX ORDER — ASPIRIN 325 MG
325 TABLET ORAL
Status: COMPLETED | OUTPATIENT
Start: 2023-11-29 | End: 2023-11-29

## 2023-11-29 RX ORDER — NALOXONE HCL 0.4 MG/ML
0.02 VIAL (ML) INJECTION
Status: DISCONTINUED | OUTPATIENT
Start: 2023-11-29 | End: 2023-12-01 | Stop reason: HOSPADM

## 2023-11-29 RX ORDER — IPRATROPIUM BROMIDE AND ALBUTEROL SULFATE 2.5; .5 MG/3ML; MG/3ML
3 SOLUTION RESPIRATORY (INHALATION) ONCE
Status: COMPLETED | OUTPATIENT
Start: 2023-11-29 | End: 2023-11-29

## 2023-11-29 RX ORDER — IPRATROPIUM BROMIDE AND ALBUTEROL SULFATE 2.5; .5 MG/3ML; MG/3ML
3 SOLUTION RESPIRATORY (INHALATION) EVERY 4 HOURS
Status: DISCONTINUED | OUTPATIENT
Start: 2023-11-29 | End: 2023-11-29

## 2023-11-29 RX ORDER — IBUPROFEN 200 MG
24 TABLET ORAL
Status: DISCONTINUED | OUTPATIENT
Start: 2023-11-29 | End: 2023-12-01 | Stop reason: HOSPADM

## 2023-11-29 RX ORDER — IBUPROFEN 400 MG/1
400 TABLET ORAL EVERY 6 HOURS PRN
Status: DISCONTINUED | OUTPATIENT
Start: 2023-11-29 | End: 2023-12-01 | Stop reason: HOSPADM

## 2023-11-29 RX ORDER — ONDANSETRON 4 MG/1
4 TABLET, ORALLY DISINTEGRATING ORAL EVERY 6 HOURS PRN
Status: DISCONTINUED | OUTPATIENT
Start: 2023-11-29 | End: 2023-12-01 | Stop reason: HOSPADM

## 2023-11-29 RX ORDER — IPRATROPIUM BROMIDE AND ALBUTEROL SULFATE 2.5; .5 MG/3ML; MG/3ML
3 SOLUTION RESPIRATORY (INHALATION) EVERY 6 HOURS PRN
Status: DISCONTINUED | OUTPATIENT
Start: 2023-11-29 | End: 2023-12-01 | Stop reason: HOSPADM

## 2023-11-29 RX ORDER — GLUCAGON 1 MG
1 KIT INJECTION
Status: DISCONTINUED | OUTPATIENT
Start: 2023-11-29 | End: 2023-12-01 | Stop reason: HOSPADM

## 2023-11-29 RX ORDER — IBUPROFEN 200 MG
16 TABLET ORAL
Status: DISCONTINUED | OUTPATIENT
Start: 2023-11-29 | End: 2023-12-01 | Stop reason: HOSPADM

## 2023-11-29 RX ADMIN — CEFTRIAXONE 2 G: 2 INJECTION, POWDER, FOR SOLUTION INTRAMUSCULAR; INTRAVENOUS at 12:11

## 2023-11-29 RX ADMIN — DEXAMETHASONE SODIUM PHOSPHATE 8 MG: 4 INJECTION INTRA-ARTICULAR; INTRALESIONAL; INTRAMUSCULAR; INTRAVENOUS; SOFT TISSUE at 11:11

## 2023-11-29 RX ADMIN — IPRATROPIUM BROMIDE AND ALBUTEROL SULFATE 3 ML: .5; 3 SOLUTION RESPIRATORY (INHALATION) at 11:11

## 2023-11-29 RX ADMIN — AZITHROMYCIN MONOHYDRATE 500 MG: 500 INJECTION, POWDER, LYOPHILIZED, FOR SOLUTION INTRAVENOUS at 01:11

## 2023-11-29 RX ADMIN — IPRATROPIUM BROMIDE AND ALBUTEROL SULFATE 3 ML: 2.5; .5 SOLUTION RESPIRATORY (INHALATION) at 08:11

## 2023-11-29 RX ADMIN — SODIUM CHLORIDE AND POTASSIUM CHLORIDE: 9; 1.49 INJECTION, SOLUTION INTRAVENOUS at 05:11

## 2023-11-29 RX ADMIN — IOHEXOL 100 ML: 350 INJECTION, SOLUTION INTRAVENOUS at 11:11

## 2023-11-29 RX ADMIN — SODIUM CHLORIDE 1000 ML: 9 INJECTION, SOLUTION INTRAVENOUS at 12:11

## 2023-11-29 RX ADMIN — SODIUM CHLORIDE 500 ML: 9 INJECTION, SOLUTION INTRAVENOUS at 11:11

## 2023-11-29 RX ADMIN — ASPIRIN 325 MG ORAL TABLET 325 MG: 325 PILL ORAL at 11:11

## 2023-11-29 NOTE — ASSESSMENT & PLAN NOTE
Lindsey Ruts is a 68 year old female with chronic tobacco use who presented to the ED today due to concern for difficulty breathing, likely related to left lower lobe bacterial pneumonia. Patient with Hypoxic Respiratory failure which is Acute.  she is not on home oxygen. Supplemental oxygen was provided and noted- Oxygen Concentration (%):  [88-95] 95. Signs/symptoms of respiratory failure include- tachypnea, increased work of breathing, and respiratory distress. CT findings concerning for emphysema. Contributing diagnoses includes - Pneumonia Labs and images were reviewed. Patient Has recent ABG, which has been reviewed.     -She is currently afebrile and HDS. Maintaining goal saturations on 3L supplemental oxygen via nasal cannula  -Patient reports improvement since being evaluated in the ED; will continue to monitor hypoxia and respiratory status for improvement  -Will treat underlying causes and adjust management of respiratory failure as follows: Azithromycin, Ceftriaxone, Duonebs, supplemental oxygen.   -Smoking cessation encouraged. Outpatient referral to pulmonary upon discharge (order placed).  -Continue IV fluids while oral intake remains decreased in the setting of her acute illness. Boost ordered TID. Encourage oral intake.

## 2023-11-29 NOTE — ADMISSIONCARE
AdmissionCare    Guideline: Pneumonia - INPT, Inpatient    Based on the indications selected for the patient, the bed status of Admit to Inpatient was determined to be MET    The following indications were selected as present at the time of evaluation of the patient:      Hypoxemia, as indicated by 1 or more of the following:   -     - Patient without baseline need for supplemental oxygen with oxygen saturation (SaO2) of less than 90% or arterial blood gas partial pressure of oxygen (PO2) of less than 60 mm Hg (8.0 kPa) on room air    AdmissionCare documentation entered by: Chris Miller    [facility], 27th edition, Copyright © 2023 [facility] [employer], [facility] All Rights Reserved.  [id number]

## 2023-11-29 NOTE — ED PROVIDER NOTES
"Encounter Date: 11/29/2023    SCRIBE #1 NOTE: I, Fernanda Izaguirre, am scribing for, and in the presence of,  Stephanie Camargo DO.       History     Chief Complaint   Patient presents with    Shortness of Breath     A 69 y/o female presents to the ER c/o SOB since 11/19 on exertion. Pt reports decreased oral intake and "increased discomfort while breathing". Denies CP, ABD pain, and Dizziness. SPO2 91. Afebrile.      Lindsey Rust is a 68 y.o. female with no pertinent Hx who presents to the ED for chief complaint of SOB on exertion since 11/19/2023. Patient reports URI symptoms began on 11/19/2023, and most have resolved, but SOB and congestion are lingering. Patient reports intermittent wheezing. Denies use of O2 at home. Denies Hx of similar symptoms. Denies medical problems. Reports last admission to hospital was several years ago. Admits to smoking cigarettes (1ppd), denies EtOH or illicit drug use. No other exacerbating or alleviating factors. Denies fever, chills, CP, nausea, vomiting, dysuria, BLE pain, BLE swelling, or other associated symptoms.         The history is provided by the patient. No  was used.     Review of patient's allergies indicates:  No Known Allergies  Past Medical History:   Diagnosis Date    Sciatic pain      Past Surgical History:   Procedure Laterality Date    VAGINAL DELIVERY      X5     Family History   Problem Relation Age of Onset    Diabetes Mother     Asthma Daughter     Asthma Son      Social History     Tobacco Use    Smoking status: Some Days     Current packs/day: 0.50     Average packs/day: 0.5 packs/day for 20.0 years (10.0 ttl pk-yrs)     Types: Cigarettes   Substance Use Topics    Alcohol use: Yes     Comment: twice a week    Drug use: No     Review of Systems   Constitutional:  Negative for chills and fever.   HENT:  Positive for congestion. Negative for rhinorrhea and sore throat.    Eyes:  Negative for redness.   Respiratory:  Positive for shortness of " breath and wheezing.    Cardiovascular:  Negative for chest pain and leg swelling.   Gastrointestinal:  Negative for abdominal pain, diarrhea, nausea and vomiting.   Genitourinary:  Negative for dysuria.   Musculoskeletal:  Negative for back pain.        (-) BLE pain   Skin:  Negative for rash.   Neurological:  Negative for syncope and headaches.   All other systems reviewed and are negative.      Physical Exam     Initial Vitals [11/29/23 0952]   BP Pulse Resp Temp SpO2   126/76 (!) 113 (!) 24 98.1 °F (36.7 °C) (!) 91 %      MAP       --         Physical Exam    Nursing note and vitals reviewed.  Constitutional: She appears well-developed and well-nourished.   HENT:   Head: Normocephalic and atraumatic.   Right Ear: External ear normal.   Left Ear: External ear normal.   Nose: Nose normal.   Mouth/Throat: Oropharynx is clear and moist.   Eyes: Conjunctivae are normal.   Neck: Phonation normal. Neck supple.   Normal range of motion.  Cardiovascular:  Regular rhythm, normal heart sounds and intact distal pulses.   Tachycardia present.   Exam reveals no gallop and no friction rub.       No murmur heard.  Pulmonary/Chest: Effort normal. No stridor. No respiratory distress. She has decreased breath sounds (at bases bilaterally). She has no wheezes. She has no rhonchi. She has no rales. She exhibits no tenderness.   Abdominal: Abdomen is soft. Bowel sounds are normal. She exhibits no distension. There is no abdominal tenderness. There is no rigidity, no rebound and no guarding.   Musculoskeletal:         General: No tenderness or edema. Normal range of motion.      Cervical back: Normal range of motion and neck supple.      Comments: Extremities normal      Neurological: She is alert and oriented to person, place, and time. She has normal strength. No cranial nerve deficit or sensory deficit. GCS score is 15. GCS eye subscore is 4. GCS verbal subscore is 5. GCS motor subscore is 6.   Skin: Skin is warm and dry. Capillary  refill takes less than 2 seconds. No rash noted.   Psychiatric: Her behavior is normal. Her mood appears anxious.       Patient gave consent to have physical exam performed.      ED Course   Critical Care    Date/Time: 11/29/2023 10:30 AM    Performed by: Stephanie Camargo DO  Authorized by: Stephanie Camargo DO  Direct patient critical care time: 8 minutes  Additional history critical care time: 8 minutes  Ordering / reviewing critical care time: 8 minutes  Documentation critical care time: 8 minutes  Consulting other physicians critical care time: 8 minutes  Consult with family critical care time: 8 minutes  Total critical care time (exclusive of procedural time) : 48 minutes  Critical care was necessary to treat or prevent imminent or life-threatening deterioration of the following conditions: respiratory failure.  Critical care was time spent personally by me on the following activities: interpretation of cardiac output measurements, evaluation of patient's response to treatment, examination of patient, obtaining history from patient or surrogate, ordering and performing treatments and interventions, ordering and review of laboratory studies, ordering and review of radiographic studies, pulse oximetry, re-evaluation of patient's condition and review of old charts.        Labs Reviewed   ISTAT PROCEDURE - Abnormal; Notable for the following components:       Result Value    POC PTWBT 17.5 (*)     POC PTINR 1.5 (*)     All other components within normal limits   POCT CMP - Abnormal; Notable for the following components:    ALT (SGPT), POC 59 (*)     AST (SGOT), POC 39 (*)     Protein, POC 9.0 (*)     All other components within normal limits   ISTAT PROCEDURE - Abnormal; Notable for the following components:    POC PH 7.536 (*)     POC PCO2 33.1 (*)     POC PO2 65 (*)     POC BE 6 (*)     POC TCO2 29 (*)     All other components within normal limits   CULTURE, BLOOD   CULTURE, BLOOD   TROPONIN ISTAT   POCT CBC    SARS-COV-2 RDRP GENE   POCT INFLUENZA A/B MOLECULAR   POCT URINALYSIS(INSTRUMENT)   POCT CMP   POCT PROTIME-INR   POCT TROPONIN   POCT B-TYPE NATRIURETIC PEPTIDE (BNP)   POCT B-TYPE NATRIURETIC PEPTIDE (BNP)   POCT RAPID INFLUENZA A/B     EKG Readings: (Independently Interpreted)   No STEMI. Rate of 104. Sinus tachycardia. Left Axis. Abnormal EKG. QTc at 449. When compared to prior EKG dated 5/10/2020 rate increased by 1 bpm.        ECG Results              EKG 12-lead (Final result)  Result time 11/29/23 13:15:46      Final result by Interface, Lab In [facility] (11/29/23 13:15:46)                   Narrative:    Test Reason : R09.02,    Vent. Rate : 104 BPM     Atrial Rate : 104 BPM     P-R Int : 142 ms          QRS Dur : 086 ms      QT Int : 342 ms       P-R-T Axes : 081 -47 058 degrees     QTc Int : 449 ms    Sinus tachycardia  Right atrial enlargement  Pulmonary disease pattern  Left anterior fascicular block  Abnormal ECG  When compared with ECG of 10-MAY-2020 14:31,  No significant change was found  Confirmed by Ivan Espinal MD ([id number]) on 11/29/2023 12:06:21 PM    Referred By: [email or web address]   SELF           Confirmed By:Ivan Espinal MD                                  Imaging Results              CTA Chest Non-Coronary (PE Studies) (Final result)  Result time 11/29/23 11:44:54      Final result by Rusty Hayward MD (11/29/23 11:44:54)                   Impression:      1. Limited assessment of segmental branches in the lingula and anterior basilar left lower lobe.  Remainder of pulmonary arteries exhibit no evidence for acute thromboembolism.  2. Focal subsegmental consolidation in the left lower lobe, perhaps pneumonia.  Given possibility of underlying neoplasm, recommend follow-up noncontrast chest CT in 3 months.  3. Moderate centrilobular emphysema.      Electronically signed by: Rusty Hayward MD  Date:    11/29/2023  Time:    11:44               Narrative:    EXAMINATION:  CTA CHEST NON CORONARY (PE  STUDIES)    CLINICAL HISTORY:  Pulmonary embolism (PE) suspected, high prob;    TECHNIQUE:  Low dose axial images, sagittal and coronal reformations were obtained from the thoracic inlet to the lung bases following the IV administration of 100 mL of Omnipaque 350.  Contrast timing was optimized to evaluate the pulmonary arteries.  Examination degraded by motion artifact.    COMPARISON:  CT abdomen and pelvis 05/10/2020    FINDINGS:  There are no filling defects in the pulmonary arteries to the level of the lobar branches.  Assessment of opacification of the segmental branches in the lingula and anterior basilar left lower lobe limited due to motion artifact.  No definite central filling defect to suggest pulmonary embolism.  No CT evidence for right heart strain.    No pericardial or pleural effusion.  No mediastinal or hilar lymphadenopathy.  Heart is normal size.  Thoracic aorta is normal caliber.    There is moderate centrilobular emphysema.  Focal subsegmental consolidation noted in the left lower lobe (series 3, image 181).  Patchy ground-glass opacities are seen in the lingula and left lower lobe.  Note made of right apical scarring.  Central airways are patent, without endobronchial lesions.  No pneumothorax.    Regional osseous structures demonstrate no aggressive lytic or blastic lesions.  Note made of subcentimeter hypodense lesion in the right hepatic lobe, unchanged from prior study.                                       US Lower Extremity Veins Bilateral (Final result)  Result time 11/29/23 11:32:50      Final result by Ho Karimi MD (11/29/23 11:32:50)                   Impression:      1. No sonographic evidence of DVT in the bilateral lower extremities.      Electronically signed by: Ho Karimi  Date:    11/29/2023  Time:    11:32               Narrative:    EXAMINATION:  US LOWER EXTREMITY VEINS BILATERAL    CLINICAL HISTORY:  Shortness of breath    TECHNIQUE:  Duplex and color flow Doppler  and dynamic compression was performed of the bilateral lower extremity veins was performed.    COMPARISON:  None    FINDINGS:  Right lower extremity    Common femoral, superficial femoral, popliteal, upper greater saphenous and deep femoral veins demonstrate normal compressibility, phasic flow and augmentation response without evidence of filling defect. Visualized calf veins are patent.    Left lower extremity    Common femoral, superficial femoral, popliteal, upper greater saphenous and deep femoral veins demonstrate normal compressibility, phasic flow and augmentation response without evidence of filling defect. Visualized calf veins are patent.                                       X-Ray Chest AP Portable (Final result)  Result time 11/29/23 10:21:12      Final result by Denis Mendez MD (11/29/23 10:21:12)                   Impression:      See above      Electronically signed by: Denis Mendez MD  Date:    11/29/2023  Time:    10:21               Narrative:    EXAMINATION:  XR CHEST AP PORTABLE    CLINICAL HISTORY:  SOB;    TECHNIQUE:  Single frontal view of the chest was performed.    COMPARISON:  05/10/2020    FINDINGS:  Cardiac size is normal.  Lungs are clear.  No significant infiltrate is seen.                                       Medications   azithromycin (ZITHROMAX) 500 mg in dextrose 5 % (D5W) 250 mL IVPB (Vial-Mate) (500 mg Intravenous New Bag 11/29/23 1341)   sodium chloride 0.9% bolus 500 mL 500 mL (0 mLs Intravenous Stopped 11/29/23 1211)   aspirin tablet 325 mg (325 mg Oral Given 11/29/23 1111)   iohexoL (OMNIPAQUE 350) injection 100 mL (100 mLs Intravenous Given 11/29/23 1138)   dexAMETHasone injection 8 mg (8 mg Intravenous Given 11/29/23 1148)   albuterol-ipratropium 2.5 mg-0.5 mg/3 mL nebulizer solution 3 mL (3 mLs Nebulization Given 11/29/23 1146)   sodium chloride 0.9% bolus 1,000 mL 1,000 mL (1,000 mLs Intravenous New Bag 11/29/23 1239)   cefTRIAXone (ROCEPHIN) 2 g in dextrose  "5 % in water (D5W) 100 mL IVPB (MB+) (0 g Intravenous Stopped 11/29/23 1324)     Medical Decision Making  Amount and/or Complexity of Data Reviewed  Labs: ordered. Decision-making details documented in ED Course.  Radiology: ordered. Decision-making details documented in ED Course.  ECG/medicine tests: ordered and independent interpretation performed. Decision-making details documented in ED Course.     Details: EKG independently interpreted by Stephanie Camargo DO reads: see ED course.     Risk  OTC drugs.  Prescription drug management.  Decision regarding hospitalization.    Medical Decision Making:    This is an evaluation of a 68 y.o. female that presents to the Emergency Department for SOB on exertion  Chief Complaint   Patient presents with    Shortness of Breath     A 67 y/o female presents to the ER c/o SOB since 11/19 on exertion. Pt reports decreased oral intake and "increased discomfort while breathing". Denies CP, ABD pain, and Dizziness. SPO2 91. Afebrile.          The patient is a non-toxic and well appearing patient. On physical exam, patient appears anxious and well hydrated with moist mucus membranes. Breath sounds are diminished at bases bilaterally. Tachycardic. No murmurs. Abdomen soft and non tender. Extremities normal. Patient is tolerating PO without difficulty. Physical exam otherwise as above.     I have reviewed vital signs and nursing notes.   Vital Signs Are Reassuring.     Based on the patient's symptoms, I am considering and evaluating for the following differential diagnoses: influenza A, influenza B, COVID, pneumonia, pneumothorax, PE, COPD, respiratory failure, hypoxia    Consider hospitalization for:  Hypoxia.  Patient is requiring oxygen to maintain O2 sats greater than 88%.  When patient ambulates O2 sats are dropping below 88%.  While patient is sitting in the bed speaking patient's O2 sats are  88%.  Initially patient declined hospitalization however with persistent hypoxia patient " is now agreeable to hospital admission  Patient is agreeable to transfer and admission to [facility].    ED Course:Treatment in the ED included Physical Exam and medications given in ED  Medications   azithromycin (ZITHROMAX) 500 mg in dextrose 5 % (D5W) 250 mL IVPB (Vial-Mate) (500 mg Intravenous New Bag 11/29/23 1341)   sodium chloride 0.9% bolus 500 mL 500 mL (0 mLs Intravenous Stopped 11/29/23 1211)   aspirin tablet 325 mg (325 mg Oral Given 11/29/23 1111)   iohexoL (OMNIPAQUE 350) injection 100 mL (100 mLs Intravenous Given 11/29/23 1138)   dexAMETHasone injection 8 mg (8 mg Intravenous Given 11/29/23 1148)   albuterol-ipratropium 2.5 mg-0.5 mg/3 mL nebulizer solution 3 mL (3 mLs Nebulization Given 11/29/23 1146)   sodium chloride 0.9% bolus 1,000 mL 1,000 mL (1,000 mLs Intravenous New Bag 11/29/23 1239)   cefTRIAXone (ROCEPHIN) 2 g in dextrose 5 % in water (D5W) 100 mL IVPB (MB+) (0 g Intravenous Stopped 11/29/23 1324)   .   1300: Patient reports feeling better after treatment in the ER. Patient offered hospital admission. Patient declined.       External Data/Documents Reviewed: Previous medical records and vital signs reviewed, see HPI and Physical exam.   Labs: ordered and reviewed.  Lactic acid is 0.7.  Radiology: ordered as indicated and reviewed.  No large pneumothorax appreciated  ECG/medicine tests: ordered and independent interpretation performed by Dr. Stephanie Camargo DO. Decision-making details documented in ED Course.   Cardiac monitor placed for hypoxia. Monitor shows Sinus Tachycardia. Interpreted by Dr. Stephanie Camargo DO.    Risk  Diagnosis or treatment significantly limited by the following social determinants of health: Body mass index is 22.87 kg/m².     In shared decision making with the patient, we discussed treatment, prescriptions, labs, and imaging results.    I contacted EDA at time [place]inta at 1:25 p.m., requesting consult with Hospital Medicine for admission.  Requesting consultation  with Hospital Medicine specialists for services not available at this facility.   Discussed patient's presentation, past medical history, physical exam, labs, radiology results, vital signs, and ED course.      Patient accepted by Dr Daniels for transfer and admission at time 1:46 p.m.   At this time patient will be transferred & admitted.  Patient will be transferred via EMS to accepting facility.      At time of admission patient is awake alert oriented x4 speaking clearly in full sentences and moving all 4 extremities.     The following labs and imaging were reviewed:    Insert CBC here     Admission on 11/29/2023   Component Date Value Ref Range Status    POC PTWBT 11/29/2023 17.5 (H)  9.7 - 14.3 sec Final    POC PTINR 11/29/2023 1.5 (H)  0.9 - 1.2 Final    Sample 11/29/2023 unknown   Final    Albumin, POC 11/29/2023 4.0  3.3 - 5.5 g/dL Final    Alkaline Phosphatase, POC 11/29/2023 76  42 - 141 U/L Final    ALT (SGPT), POC 11/29/2023 59 (H)  10 - 47 U/L Final    AST (SGOT), POC 11/29/2023 39 (H)  11 - 38 U/L Final    POC BUN 11/29/2023 8  7 - 22 mg/dL Final    Calcium, POC 11/29/2023 9.8  8.0 - 10.3 mg/dL Final    POC Chloride 11/29/2023 102  98 - 108 mmol/L Final    POC Creatinine 11/29/2023 0.9  0.6 - 1.2 mg/dL Final    POC Glucose 11/29/2023 113  73 - 118 mg/dL Final    POC Potassium 11/29/2023 3.6  3.6 - 5.1 mmol/L Final    POC Sodium 11/29/2023 142  128 - 145 mmol/L Final    Bilirubin, POC 11/29/2023 0.8  0.2 - 1.6 mg/dL Final    POC TCO2 11/29/2023 32  18 - 33 mmol/L Final    Protein, POC 11/29/2023 9.0 (H)  6.4 - 8.1 g/dL Final    POC Cardiac Troponin I 11/29/2023 0.02  0.00 - 0.08 ng/mL Final    Sample 11/29/2023 unknown   Final    Comment: A single negative troponin is insufficient to rule out myocardial infarction.  The use of a serial sampling protocol is recommended practice. Correlate results with reference intervals established for methodology used. Point of care and core laboratory   troponin  results are not interchangeable.      POC B-Type Natriuretic Peptide 11/29/2023 <5.0  0.0 - 100.0 pg/mL Final    POC PH 11/29/2023 7.536 (H)  7.35 - 7.45 Final    POC PCO2 11/29/2023 33.1 (L)  35 - 45 mmHg Final    POC PO2 11/29/2023 65 (L)  80 - 100 mmHg Final    POC HCO3 11/29/2023 28.0  24 - 28 mmol/L Final    POC BE 11/29/2023 6 (H)  -2 to 2 mmol/L Final    POC SATURATED O2 11/29/2023 95  95 - 100 % Final    POC Lactate 11/29/2023 0.70  0.36 - 1.25 mmol/L Final    POC TCO2 11/29/2023 29 (H)  23 - 27 mmol/L Final    Sample 11/29/2023 ARTERIAL   Final    Site 11/29/2023 LB   Final    Allens Test 11/29/2023 Pass   Final    DelSys 11/29/2023 Room Air   Final    Influenza B Ag 11/29/2023 negative  Positive/Negative Final    Inflenza A Ag 11/29/2023 negative  Positive/Negative Final        Imaging Results              CTA Chest Non-Coronary (PE Studies) (Final result)  Result time 11/29/23 11:44:54      Final result by Rusty Hayward MD (11/29/23 11:44:54)                   Impression:      1. Limited assessment of segmental branches in the lingula and anterior basilar left lower lobe.  Remainder of pulmonary arteries exhibit no evidence for acute thromboembolism.  2. Focal subsegmental consolidation in the left lower lobe, perhaps pneumonia.  Given possibility of underlying neoplasm, recommend follow-up noncontrast chest CT in 3 months.  3. Moderate centrilobular emphysema.      Electronically signed by: Rusty Hayward MD  Date:    11/29/2023  Time:    11:44               Narrative:    EXAMINATION:  CTA CHEST NON CORONARY (PE STUDIES)    CLINICAL HISTORY:  Pulmonary embolism (PE) suspected, high prob;    TECHNIQUE:  Low dose axial images, sagittal and coronal reformations were obtained from the thoracic inlet to the lung bases following the IV administration of 100 mL of Omnipaque 350.  Contrast timing was optimized to evaluate the pulmonary arteries.  Examination degraded by motion artifact.    COMPARISON:  CT  abdomen and pelvis 05/10/2020    FINDINGS:  There are no filling defects in the pulmonary arteries to the level of the lobar branches.  Assessment of opacification of the segmental branches in the lingula and anterior basilar left lower lobe limited due to motion artifact.  No definite central filling defect to suggest pulmonary embolism.  No CT evidence for right heart strain.    No pericardial or pleural effusion.  No mediastinal or hilar lymphadenopathy.  Heart is normal size.  Thoracic aorta is normal caliber.    There is moderate centrilobular emphysema.  Focal subsegmental consolidation noted in the left lower lobe (series 3, image 181).  Patchy ground-glass opacities are seen in the lingula and left lower lobe.  Note made of right apical scarring.  Central airways are patent, without endobronchial lesions.  No pneumothorax.    Regional osseous structures demonstrate no aggressive lytic or blastic lesions.  Note made of subcentimeter hypodense lesion in the right hepatic lobe, unchanged from prior study.                                       US Lower Extremity Veins Bilateral (Final result)  Result time 11/29/23 11:32:50      Final result by Ho Karimi MD (11/29/23 11:32:50)                   Impression:      1. No sonographic evidence of DVT in the bilateral lower extremities.      Electronically signed by: Ho Karimi  Date:    11/29/2023  Time:    11:32               Narrative:    EXAMINATION:  US LOWER EXTREMITY VEINS BILATERAL    CLINICAL HISTORY:  Shortness of breath    TECHNIQUE:  Duplex and color flow Doppler and dynamic compression was performed of the bilateral lower extremity veins was performed.    COMPARISON:  None    FINDINGS:  Right lower extremity    Common femoral, superficial femoral, popliteal, upper greater saphenous and deep femoral veins demonstrate normal compressibility, phasic flow and augmentation response without evidence of filling defect. Visualized calf veins are  patent.    Left lower extremity    Common femoral, superficial femoral, popliteal, upper greater saphenous and deep femoral veins demonstrate normal compressibility, phasic flow and augmentation response without evidence of filling defect. Visualized calf veins are patent.                                       X-Ray Chest AP Portable (Final result)  Result time 11/29/23 10:21:12      Final result by Denis Mendez MD (11/29/23 10:21:12)                   Impression:      See above      Electronically signed by: Denis Mendez MD  Date:    11/29/2023  Time:    10:21               Narrative:    EXAMINATION:  XR CHEST AP PORTABLE    CLINICAL HISTORY:  SOB;    TECHNIQUE:  Single frontal view of the chest was performed.    COMPARISON:  05/10/2020    FINDINGS:  Cardiac size is normal.  Lungs are clear.  No significant infiltrate is seen.                                            Scribe Attestation:   Scribe #1: I performed the above scribed service and the documentation accurately describes the services I performed. I attest to the accuracy of the note.                          I, Dr. Stephanie Camargo, personally performed the services described in this documentation. This document was produced by a scribe under my direction and in my presence. All medical record entries made by the scribe were at my direction and in my presence.  I have reviewed the chart and agree that the record reflects my personal performance and is accurate and complete. Stephanie Camargo DO.     11/29/2023 1:54 PM        Clinical Impression:  Final diagnoses:  [R09.02] Hypoxia  [R06.02] SOB (shortness of breath)  [M79.89] Leg swelling  [J18.9] Pneumonia of left lower lobe due to infectious organism (Primary)          ED Disposition Condition    Admit Stable                Stephanie Camargo DO  11/29/23 8535

## 2023-11-29 NOTE — HPI
"Lindsey Rust is a 68 year old female with chronic tobacco use who presented to the ED today due to concern for difficulty breathing. Patient reports that since "November 19th if you can believe it or not" she's been having this issue. She reports that her symptoms initially started a few weeks ago with "runny nose and cough" and then progressed to "having trouble breathing." She reports phlegm production that is "yellowish" as well as wheezing but denies any fevers, chills, nausea, emesis, abdominal pain, headache, sore throat, diarrhea, syncopal episodes, or chest pain. She denies feeling tired "I just couldn't breathe good." She does have chronic tobacco use (1ppd for "years") but with her breathing issues these past few week she hasn't been smoking. She also reports decreased oral intake "I've only had 2 chicken wings, 1 bowl of grits, and water in the past 13 days). In the ED in [place] she was noted to be hypoxic and diagnosed with pneumonia. She was placed on supplemental oxygen and provided antibiotics (ceftriaxone and azithromycin), decadron, duoneb, and IV fluid with reported improvement.  "

## 2023-11-29 NOTE — H&P
"  [facility] Medicine  History & Physical    Patient Name: Lindsey Rust  MRN: [id number]  Patient Class: IP- Inpatient  Admission Date: 11/29/2023  Attending Physician: Aylin Daniels MD   Primary Care Provider: Jojo, Primary Doctor    Patient information was obtained from patient and ER records.     Subjective:     Principal Problem:Acute hypoxic respiratory failure    Chief Complaint: "I was having trouble breathing"     HPI: Lindsey Rust is a 68 year old female with chronic tobacco use who presented to the ED today due to concern for difficulty breathing. Patient reports that since "November 19th if you can believe it or not" she's been having this issue. She reports that her symptoms initially started a few weeks ago with "runny nose and cough" and then progressed to "having trouble breathing." She reports phlegm production that is "yellowish" as well as wheezing but denies any fevers, chills, nausea, emesis, abdominal pain, headache, sore throat, diarrhea, syncopal episodes, or chest pain. She denies feeling tired "I just couldn't breathe good." She does have chronic tobacco use (1ppd for "years") but with her breathing issues these past few week she hasn't been smoking. She also reports decreased oral intake "I've only had 2 chicken wings, 1 bowl of grits, and water in the past 13 days). In the ED in [place] she was noted to be hypoxic and diagnosed with pneumonia. She was placed on supplemental oxygen and provided antibiotics (ceftriaxone and azithromycin), decadron, duoneb, and IV fluid with reported improvement.    Past Medical History:   Diagnosis Date    Sciatic pain      Surgical History: "Cyst" removed from right ear as a child.    Review of patient's allergies indicates:  No Known Allergies    No current facility-administered medications on file prior to encounter.     Current Outpatient Medications on File Prior to Encounter   Medication Sig    diazePAM (VALIUM) 5 MG tablet Take 1 " "tablet (5 mg total) by mouth every 6 (six) hours as needed for Anxiety.    HYDROcodone-acetaminophen (NORCO) 5-325 mg per tablet Take 1 tablet by mouth every 4 (four) hours as needed for Pain.    ibuprofen (ADVIL,MOTRIN) 600 MG tablet Take 1 tablet (600 mg total) by mouth every 6 (six) hours as needed for Pain.    levoFLOXacin (LEVAQUIN) 750 MG tablet Take 1 tablet (750 mg total) by mouth once daily.    LIDOcaine (LIDODERM) 5 % Place 1 patch onto the skin once daily. Remove & Discard patch within 12 hours or as directed by MD    ondansetron (ZOFRAN-ODT) 4 MG TbDL Take 1 tablet (4 mg total) by mouth every 6 (six) hours as needed.     Family History       Problem Relation (Age of Onset)    Asthma Daughter, Son    Diabetes Mother   Mother  from "heart trouble." Father  from "complications after he was shot in the head."       Social History: Lives with . Smokes 1ppd (x "many" years >20). Denies current alcohol use. Denies history of substance abuse. No pets. Independently ambulates and able to care for herself at home.    Review of Systems as per HPI otherwise 12 point ROS reviewed and negative.  Objective:     Vital Signs (Most Recent):  Temp: 98.2 °F (36.8 °C) (23)  Pulse: 89 (23)  Resp: 18 (23)  BP: (!) 145/67 (23)  SpO2: (!) 94 % (23) Vital Signs (24h Range):  Temp:  [98.1 °F (36.7 °C)-98.2 °F (36.8 °C)] 98.2 °F (36.8 °C)  Pulse:  [] 89  Resp:  [18-24] 18  SpO2:  [88 %-97 %] 94 %  BP: (126-159)/() 145/67     Weight: 60.6 kg (133 lb 9.6 oz)  Body mass index is 20.92 kg/m².     Physical Exam   Constitutional: She appears well-developed and well-nourished. No acute distress.  Head: Normocephalic and atraumatic. No abnormal appearing lesions noted.  Ears: External ears normal with no drainage   Eyes: Conjunctivae are normal. No eye drainage. Wearing glasses.  Nose: +nasal congestion. Nares patent. No nasal flaring. NC in " "place.  Mouth/Throat: Oropharynx is clear and moist. No posterior oropharyngeal erythema, ulcers, or exudates.  Neck: Phonation normal. Neck supple. FROM. No abnormal cervical/submandibular lymphadenopathy appreciated.  Cardiovascular:  RRR.  Exam reveals no gallop and no friction rub.  No murmur heard.  Pulmonary/Chest: Effort normal. No stridor. No respiratory distress. She has decreased breath sounds (at bases bilaterally) with minimal LLL rales appreciated. She has no wheezes.   Abdominal: Abdomen is soft. Bowel sounds are normal. She exhibits no distension. There is no abdominal tenderness. There is no rigidity, no rebound and no guarding.   Musculoskeletal: Normal tone and muscle bulk   Extremities: Small lesion on right arm ("a mole I picked at") with no infected appearing areas noted. No edema.  Neurological: She is alert and oriented. She has normal strength. No cranial nerve deficit or sensory deficit appreciated.  Skin: Skin is warm and dry. Capillary refill takes less than 2 seconds. No diffuse rash noted.   Psychiatric: Her behavior is normal. Her mood is normal     Significant Labs: All pertinent labs within the past 24 hours have been reviewed.    Influenza: negative. COVID-19: negative. Troponin 0.02 and BNP <5 (normal). Blood cultures: pending x2 sets            Significant Imaging: I have reviewed all pertinent imaging results/findings within the past 24 hours.    CTA Chest 11/29/2023  FINDINGS: There are no filling defects in the pulmonary arteries to the level of the lobar branches.  Assessment of opacification of the segmental branches in the lingula and anterior basilar left lower lobe limited due to motion artifact.  No definite central filling defect to suggest pulmonary embolism.  No CT evidence for right heart strain. No pericardial or pleural effusion.  No mediastinal or hilar lymphadenopathy.  Heart is normal size.  Thoracic aorta is normal caliber. There is moderate centrilobular " emphysema.  Focal subsegmental consolidation noted in the left lower lobe (series 3, image 181).  Patchy ground-glass opacities are seen in the lingula and left lower lobe.  Note made of right apical scarring.  Central airways are patent, without endobronchial lesions.  No pneumothorax.     Regional osseous structures demonstrate no aggressive lytic or blastic lesions.  Note made of subcentimeter hypodense lesion in the right hepatic lobe, unchanged from prior study.     LE U/S 11/29/2023 Impression:  1. Limited assessment of segmental branches in the lingula and anterior basilar left lower lobe.  Remainder of pulmonary arteries exhibit no evidence for acute thromboembolism.  2. Focal subsegmental consolidation in the left lower lobe, perhaps pneumonia.  Given possibility of underlying neoplasm, recommend follow-up noncontrast chest CT in 3 months.  3. Moderate centrilobular emphysema.    FINDINGS:  Right lower extremity: Common femoral, superficial femoral, popliteal, upper greater saphenous and deep femoral veins demonstrate normal compressibility, phasic flow and augmentation response without evidence of filling defect. Visualized calf veins are patent.     Left lower extremity: Common femoral, superficial femoral, popliteal, upper greater saphenous and deep femoral veins demonstrate normal compressibility, phasic flow and augmentation response without evidence of filling defect. Visualized calf veins are patent.     Impression: No sonographic evidence of DVT in the bilateral lower extremities.     Assessment/Plan:     * Acute hypoxic respiratory failure  Lindsey Rust is a 68 year old female with chronic tobacco use who presented to the ED today due to concern for difficulty breathing, likely related to left lower lobe bacterial pneumonia. Patient with Hypoxic Respiratory failure which is Acute.  she is not on home oxygen. Supplemental oxygen was provided and noted- Oxygen Concentration (%):  [88-95] 95.  Signs/symptoms of respiratory failure include- tachypnea, increased work of breathing, and respiratory distress. CT findings concerning for emphysema. Contributing diagnoses includes - Pneumonia Labs and images were reviewed. Patient Has recent ABG, which has been reviewed.     -She is currently afebrile and HDS. Maintaining goal saturations on 3L supplemental oxygen via nasal cannula  -Patient reports improvement since being evaluated in the ED; will continue to monitor hypoxia and respiratory status for improvement  -Will treat underlying causes and adjust management of respiratory failure as follows: Azithromycin, Ceftriaxone, Duonebs, supplemental oxygen.   -Smoking cessation encouraged. Outpatient referral to pulmonary upon discharge (order placed).  -Continue IV fluids while oral intake remains decreased in the setting of her acute illness. Boost ordered TID. Encourage oral intake.    *SW/CM consult re: disposition planning and PCP assistance as patient does not currently see a provider.    VTE Risk Mitigation (From admission, onward)           Ordered     Place sequential compression device  Until discontinued         11/29/23 4841                     AdmissionCare    Guideline: Pneumonia - INPT, Inpatient    Based on the indications selected for the patient, the bed status of Admit to Inpatient was determined to be MET    The following indications were selected as present at the time of evaluation of the patient:      Hypoxemia, as indicated by 1 or more of the following:   -     - Patient without baseline need for supplemental oxygen with oxygen saturation (SaO2) of less than 90% or arterial blood gas partial pressure of oxygen (PO2) of less than 60 mm Hg (8.0 kPa) on room air    AdmissionCare documentation entered by: Chris Miller    [facility] [employer], 27th edition, Copyright © 2023 [facility] [employer], [facility] All Rights Reserved.  [id number]    Aylin Daniels MD  Department of Hospital Medicine  [facility] - Med Surg

## 2023-11-29 NOTE — LETTER
December 1, 2023         Hung LUGO  [facility]  [place] [address]  Phone: [phone]  Fax: [phone]       Patient: Lindsey Rust   YOB: 1955  Date of Visit: 12/01/2023    To Whom It May Concern:    Rosa Rust  was at [facility] on 12/4/2023. The patient may return to work/school on 12/2/2023 with no restrictions. If you have any questions or concerns, or if I can be of further assistance, please do not hesitate to contact me.    Sincerely,    Salomon Dhillon RN

## 2023-11-29 NOTE — SUBJECTIVE & OBJECTIVE
"Past Medical History:   Diagnosis Date    Sciatic pain      Surgical History: "Cyst" removed from right ear as a child.    Review of patient's allergies indicates:  No Known Allergies    No current facility-administered medications on file prior to encounter.     Current Outpatient Medications on File Prior to Encounter   Medication Sig    diazePAM (VALIUM) 5 MG tablet Take 1 tablet (5 mg total) by mouth every 6 (six) hours as needed for Anxiety.    HYDROcodone-acetaminophen (NORCO) 5-325 mg per tablet Take 1 tablet by mouth every 4 (four) hours as needed for Pain.    ibuprofen (ADVIL,MOTRIN) 600 MG tablet Take 1 tablet (600 mg total) by mouth every 6 (six) hours as needed for Pain.    levoFLOXacin (LEVAQUIN) 750 MG tablet Take 1 tablet (750 mg total) by mouth once daily.    LIDOcaine (LIDODERM) 5 % Place 1 patch onto the skin once daily. Remove & Discard patch within 12 hours or as directed by MD    ondansetron (ZOFRAN-ODT) 4 MG TbDL Take 1 tablet (4 mg total) by mouth every 6 (six) hours as needed.     Family History       Problem Relation (Age of Onset)    Asthma Daughter, Son    Diabetes Mother   Mother  from "heart trouble." Father  from "complications after he was shot in the head."       Social History: Lives with . Smokes 1ppd (x "many" years >20). Denies current alcohol use. Denies history of substance abuse. No pets. Independently ambulates and able to care for herself at home.    Review of Systems as per HPI otherwise 12 point ROS reviewed and negative.  Objective:     Vital Signs (Most Recent):  Temp: 98.2 °F (36.8 °C) (23)  Pulse: 89 (23)  Resp: 18 (23)  BP: (!) 145/67 (23)  SpO2: (!) 94 % (23) Vital Signs (24h Range):  Temp:  [98.1 °F (36.7 °C)-98.2 °F (36.8 °C)] 98.2 °F (36.8 °C)  Pulse:  [] 89  Resp:  [18-24] 18  SpO2:  [88 %-97 %] 94 %  BP: (126-159)/() 145/67     Weight: 60.6 kg (133 lb 9.6 oz)  Body mass index is " "20.92 kg/m².     Physical Exam   Constitutional: She appears well-developed and well-nourished. No acute distress.  Head: Normocephalic and atraumatic. No abnormal appearing lesions noted.  Ears: External ears normal with no drainage   Eyes: Conjunctivae are normal. No eye drainage. Wearing glasses.  Nose: +nasal congestion. Nares patent. No nasal flaring. NC in place.  Mouth/Throat: Oropharynx is clear and moist. No posterior oropharyngeal erythema, ulcers, or exudates.  Neck: Phonation normal. Neck supple. FROM. No abnormal cervical/submandibular lymphadenopathy appreciated.  Cardiovascular:  RRR.  Exam reveals no gallop and no friction rub.  No murmur heard.  Pulmonary/Chest: Effort normal. No stridor. No respiratory distress. She has decreased breath sounds (at bases bilaterally) with minimal LLL rales appreciated. She has no wheezes.   Abdominal: Abdomen is soft. Bowel sounds are normal. She exhibits no distension. There is no abdominal tenderness. There is no rigidity, no rebound and no guarding.   Musculoskeletal: Normal tone and muscle bulk   Extremities: Small lesion on right arm ("a mole I picked at") with no infected appearing areas noted. No edema.  Neurological: She is alert and oriented. She has normal strength. No cranial nerve deficit or sensory deficit appreciated.  Skin: Skin is warm and dry. Capillary refill takes less than 2 seconds. No diffuse rash noted.   Psychiatric: Her behavior is normal. Her mood is normal     Significant Labs: All pertinent labs within the past 24 hours have been reviewed.    Influenza: negative. COVID-19: negative. Troponin 0.02 and BNP <5 (normal). Blood cultures: pending x2 sets            Significant Imaging: I have reviewed all pertinent imaging results/findings within the past 24 hours.    CTA Chest 11/29/2023  FINDINGS: There are no filling defects in the pulmonary arteries to the level of the lobar branches.  Assessment of opacification of the segmental branches " in the lingula and anterior basilar left lower lobe limited due to motion artifact.  No definite central filling defect to suggest pulmonary embolism.  No CT evidence for right heart strain. No pericardial or pleural effusion.  No mediastinal or hilar lymphadenopathy.  Heart is normal size.  Thoracic aorta is normal caliber. There is moderate centrilobular emphysema.  Focal subsegmental consolidation noted in the left lower lobe (series 3, image 181).  Patchy ground-glass opacities are seen in the lingula and left lower lobe.  Note made of right apical scarring.  Central airways are patent, without endobronchial lesions.  No pneumothorax.     Regional osseous structures demonstrate no aggressive lytic or blastic lesions.  Note made of subcentimeter hypodense lesion in the right hepatic lobe, unchanged from prior study.     LE U/S 11/29/2023 Impression:  1. Limited assessment of segmental branches in the lingula and anterior basilar left lower lobe.  Remainder of pulmonary arteries exhibit no evidence for acute thromboembolism.  2. Focal subsegmental consolidation in the left lower lobe, perhaps pneumonia.  Given possibility of underlying neoplasm, recommend follow-up noncontrast chest CT in 3 months.  3. Moderate centrilobular emphysema.    FINDINGS:  Right lower extremity: Common femoral, superficial femoral, popliteal, upper greater saphenous and deep femoral veins demonstrate normal compressibility, phasic flow and augmentation response without evidence of filling defect. Visualized calf veins are patent.     Left lower extremity: Common femoral, superficial femoral, popliteal, upper greater saphenous and deep femoral veins demonstrate normal compressibility, phasic flow and augmentation response without evidence of filling defect. Visualized calf veins are patent.     Impression: No sonographic evidence of DVT in the bilateral lower extremities.

## 2023-11-29 NOTE — NURSING
Upon arrival to floor from [facility] ED: Patient on RA; O2 sats 85%. Patient placed on 2 L NC. patient oriented to room, call bell in reach and bed in lowest position. Denies pain or discomfort at this time. No apparent distress noted.

## 2023-11-30 PROCEDURE — 27000221 HC OXYGEN, UP TO 24 HOURS: Mod: HCNC

## 2023-11-30 PROCEDURE — 11000001 HC ACUTE MED/SURG PRIVATE ROOM: Mod: HCNC

## 2023-11-30 PROCEDURE — 94761 N-INVAS EAR/PLS OXIMETRY MLT: CPT | Mod: HCNC

## 2023-11-30 PROCEDURE — 25000003 PHARM REV CODE 250: Mod: HCNC | Performed by: PEDIATRICS

## 2023-11-30 PROCEDURE — 94640 AIRWAY INHALATION TREATMENT: CPT | Mod: HCNC

## 2023-11-30 PROCEDURE — 63600175 PHARM REV CODE 636 W HCPCS: Mod: HCNC | Performed by: PEDIATRICS

## 2023-11-30 PROCEDURE — 63700000 PHARM REV CODE 250 ALT 637 W/O HCPCS: Mod: HCNC | Performed by: PEDIATRICS

## 2023-11-30 PROCEDURE — 25000242 PHARM REV CODE 250 ALT 637 W/ HCPCS: Mod: HCNC | Performed by: PEDIATRICS

## 2023-11-30 RX ORDER — SODIUM CHLORIDE AND POTASSIUM CHLORIDE 150; 900 MG/100ML; MG/100ML
INJECTION, SOLUTION INTRAVENOUS ONCE
Status: COMPLETED | OUTPATIENT
Start: 2023-11-30 | End: 2023-11-30

## 2023-11-30 RX ADMIN — AZITHROMYCIN DIHYDRATE 250 MG: 250 TABLET ORAL at 09:11

## 2023-11-30 RX ADMIN — CEFTRIAXONE 2 G: 2 INJECTION, POWDER, FOR SOLUTION INTRAMUSCULAR; INTRAVENOUS at 11:11

## 2023-11-30 RX ADMIN — IPRATROPIUM BROMIDE AND ALBUTEROL SULFATE 3 ML: 2.5; .5 SOLUTION RESPIRATORY (INHALATION) at 07:11

## 2023-11-30 RX ADMIN — SODIUM CHLORIDE AND POTASSIUM CHLORIDE: 9; 1.49 INJECTION, SOLUTION INTRAVENOUS at 03:11

## 2023-11-30 RX ADMIN — SODIUM CHLORIDE AND POTASSIUM CHLORIDE: 9; 1.49 INJECTION, SOLUTION INTRAVENOUS at 04:11

## 2023-11-30 RX ADMIN — IPRATROPIUM BROMIDE AND ALBUTEROL SULFATE 3 ML: 2.5; .5 SOLUTION RESPIRATORY (INHALATION) at 01:11

## 2023-11-30 NOTE — PROGRESS NOTES
"[facility] Medicine  Progress Note    Patient Name: Lindsey Rust  MRN: [id number]  Patient Class: IP- Inpatient   Admission Date: 11/29/2023  Length of Stay: 1 days  Attending Physician: Aylin Daniels MD  Primary Care Provider: Leticia Arcos MD        Subjective:     Principal Problem:Acute hypoxic respiratory failure    Patient was seen and examined. She reports she feels "100% better." Upon ambulation to the rest room she is no longer getting short of breathing. Appetite improving. No new symptoms. Remains on IV fluid and oxygen.      Objective:     Vital Signs (Most Recent):  Temp: 98.1 °F (36.7 °C) (11/30/23 1612)  Pulse: 77 (11/30/23 1612)  Resp: 18 (11/30/23 1612)  BP: (!) 149/66 (11/30/23 1612)  SpO2: 95 % (11/30/23 1612) Vital Signs (24h Range):  Temp:  [97.8 °F (36.6 °C)-98.5 °F (36.9 °C)] 98.1 °F (36.7 °C)  Pulse:  [73-89] 77  Resp:  [18-20] 18  SpO2:  [88 %-100 %] 95 %  BP: (127-158)/(63-80) 149/66     Weight: 60.6 kg (133 lb 9.6 oz)  Body mass index is 20.92 kg/m².    Intake/Output Summary (Last 24 hours) at 11/30/2023 1634  Last data filed at 11/30/2023 1416  Gross per 24 hour   Intake 720 ml   Output --   Net 720 ml         Physical Exam    HEENT: Normocephalic and atraumatic. Conjunctivae are normal. No eye drainage. Wearing glasses. +nasal congestion. Nares patent. No nasal flaring. NC in place. MMM. Neck supple with FROM. No stridor.  Cardiovascular:  RRR.  Exam reveals no gallop and no friction rub.  No murmur heard.  Pulmonary/Chest: Effort normal. No stridor. No respiratory distress. She has decreased air movement at the LLL rales appreciated. Otherwise, reassuring lung exam. No tractions. No wheezing  Abdominal: Abdomen is soft. Bowel sounds are normal. She exhibits no distension. There is no abdominal tenderness. There is no rigidity, no rebound and no guarding.   Musculoskeletal: Normal tone and muscle bulk   Extremities: Small lesion on right arm ("a mole I picked " "at") with no infected appearing areas noted. No edema.  Neurological: She is alert and oriented. She has normal strength. No cranial nerve deficit or sensory deficit appreciated.  Skin: Skin is warm and dry. Capillary refill takes less than 2 seconds. No diffuse rash noted.   Psychiatric: Her behavior is normal. Her mood is normal    Significant Labs: No new. Blood culture 11/29/2023: NGTD    Significant Imaging: No new    Assessment/Plan:      * Acute hypoxic respiratory failure  Lindsey Rust is a 68 year old female with chronic tobacco use who presented to the ED today due to concern for difficulty breathing, likely related to left lower lobe bacterial pneumonia. Patient with Hypoxic Respiratory failure which is Acute.  she is not on home oxygen. Supplemental oxygen was provided and noted- Oxygen Concentration (%):  [88-95] 95. Signs/symptoms of respiratory failure include- tachypnea, increased work of breathing, and respiratory distress. CT findings concerning for emphysema. Contributing diagnoses includes - Pneumonia Labs and images were reviewed. Patient Has recent ABG, which has been reviewed.     -She is currently afebrile and HDS. Maintaining goal saturations on 3L supplemental oxygen via nasal cannula  -Patient reports improvement since being evaluated in the ED; will continue to monitor hypoxia and respiratory status for improvement  -Will treat underlying causes and adjust management of respiratory failure as follows: Azithromycin, Ceftriaxone, Duonebs, supplemental oxygen.   -Smoking cessation encouraged. Outpatient referral to pulmonary upon discharge (order placed).  -Will trial off of IV fluids today as oral intake improving. Boost ordered TID. Encourage oral intake.    Attempts were made by RT and nurse to wean to room air today, however desaturations to 88% were noted. She was placed back on supplemental oxygen via nasal cannula with improvement to 95-96%.    VTE Risk Mitigation (From admission, " onward)           Ordered     Place sequential compression device  Until discontinued         11/29/23 0803                    Discharge Planning   CARIDAD:      Code Status: Full Code   Is the patient medically ready for discharge?: No as equiring oxygen.    Reason for patient still in hospital (select all that apply): Patient trending condition and Treatment    Aylin Daniels MD  Department of Hospital Medicine   [facility] Surg

## 2023-11-30 NOTE — NURSING
Weaned pt off O2 to RA with sats of 91%. Rechecked pt in 30 minutes and O2 sat dropped to 88% on RA. Reapplied NC with 1L of O2. Dr. Daniels notified.

## 2023-11-30 NOTE — PLAN OF CARE
Problem: Adult Inpatient Plan of Care  Goal: Plan of Care Review  Outcome: Ongoing, Progressing  Flowsheets (Taken 11/30/2023 0645)  Plan of Care Reviewed With: patient  Goal: Absence of Hospital-Acquired Illness or Injury  Outcome: Ongoing, Progressing  Intervention: Identify and Manage Fall Risk  Flowsheets (Taken 11/30/2023 0645)  Safety Promotion/Fall Prevention:   assistive device/personal item within reach   diversional activities provided   lighting adjusted   medications reviewed   nonskid shoes/socks when out of bed   side rails raised x 2  Intervention: Prevent Skin Injury  Flowsheets (Taken 11/30/2023 0645)  Body Position: position changed independently  Skin Protection:   adhesive use limited   incontinence pads utilized   tubing/devices free from skin contact  Intervention: Prevent and Manage VTE (Venous Thromboembolism) Risk  Flowsheets (Taken 11/30/2023 0645)  Activity Management:   Ambulated -L4   Ambulated to bathroom - L4   Rolling - L1   Sitting at edge of bed - L2   Standing - L3  VTE Prevention/Management:   ambulation promoted   intravenous hydration   ROM (active) performed  Range of Motion: active ROM (range of motion) encouraged  Intervention: Prevent Infection  Flowsheets (Taken 11/30/2023 0645)  Infection Prevention:   environmental surveillance performed   hand hygiene promoted   rest/sleep promoted   single patient room provided  Goal: Optimal Comfort and Wellbeing  Outcome: Ongoing, Progressing  Intervention: Monitor Pain and Promote Comfort  Flowsheets (Taken 11/30/2023 0645)  Pain Management Interventions:   care clustered   diversional activity provided   quiet environment facilitated   relaxation techniques promoted  Intervention: Provide Person-Centered Care  Flowsheets (Taken 11/30/2023 0645)  Trust Relationship/Rapport:   care explained   choices provided   empathic listening provided   questions answered   questions encouraged   thoughts/feelings acknowledged   emotional  support provided   reassurance provided     Problem: Gas Exchange Impaired  Goal: Optimal Gas Exchange  Outcome: Ongoing, Progressing  Intervention: Optimize Oxygenation and Ventilation  Flowsheets (Taken 11/30/2023 5828)  Airway/Ventilation Management: airway patency maintained  Head of Bed (HOB) Positioning: HOB elevated

## 2023-11-30 NOTE — NURSING
[facility] [place]  Nurses Note -- 4 Eyes      11/30/2023       Skin assessed on: Q Shift      [x] No Pressure Injuries Present    [x]Prevention Measures Documented    [] Yes LDA  for Pressure Injury Previously documented     [] Yes New Pressure Injury Discovered   [] LDA for New Pressure Injury Added      Attending RN:  Rachel Saha RN     Second RN:  SANA Spivey

## 2023-11-30 NOTE — PLAN OF CARE
Pt remained free of falls during current shift. Pt appeared to be in no distress and did not receive any prn pain medications. IV antibiotic was given per MD order. Plan of care and fall precautions reviewed with pt and verbalized understanding. Bed locked, lowered, SR up x2 and call light placed within reach.          Problem: Adult Inpatient Plan of Care  Goal: Plan of Care Review  Outcome: Ongoing, Progressing     Problem: Skin Injury Risk Increased  Goal: Skin Health and Integrity  Outcome: Ongoing, Progressing     Problem: Gas Exchange Impaired  Goal: Optimal Gas Exchange  Outcome: Ongoing, Progressing

## 2023-11-30 NOTE — SUBJECTIVE & OBJECTIVE
"  Objective:     Vital Signs (Most Recent):  Temp: 98.1 °F (36.7 °C) (11/30/23 1612)  Pulse: 77 (11/30/23 1612)  Resp: 18 (11/30/23 1612)  BP: (!) 149/66 (11/30/23 1612)  SpO2: 95 % (11/30/23 1612) Vital Signs (24h Range):  Temp:  [97.8 °F (36.6 °C)-98.5 °F (36.9 °C)] 98.1 °F (36.7 °C)  Pulse:  [73-89] 77  Resp:  [18-20] 18  SpO2:  [88 %-100 %] 95 %  BP: (127-158)/(63-80) 149/66     Weight: 60.6 kg (133 lb 9.6 oz)  Body mass index is 20.92 kg/m².    Intake/Output Summary (Last 24 hours) at 11/30/2023 1634  Last data filed at 11/30/2023 1416  Gross per 24 hour   Intake 720 ml   Output --   Net 720 ml         Physical Exam    HEENT: Normocephalic and atraumatic. Conjunctivae are normal. No eye drainage. Wearing glasses. +nasal congestion. Nares patent. No nasal flaring. NC in place. MMM. Neck supple with FROM. No stridor.  Cardiovascular:  RRR.  Exam reveals no gallop and no friction rub.  No murmur heard.  Pulmonary/Chest: Effort normal. No stridor. No respiratory distress. She has decreased air movement at the LLL rales appreciated. Otherwise, reassuring lung exam. No tractions. No wheezing  Abdominal: Abdomen is soft. Bowel sounds are normal. She exhibits no distension. There is no abdominal tenderness. There is no rigidity, no rebound and no guarding.   Musculoskeletal: Normal tone and muscle bulk   Extremities: Small lesion on right arm ("a mole I picked at") with no infected appearing areas noted. No edema.  Neurological: She is alert and oriented. She has normal strength. No cranial nerve deficit or sensory deficit appreciated.  Skin: Skin is warm and dry. Capillary refill takes less than 2 seconds. No diffuse rash noted.   Psychiatric: Her behavior is normal. Her mood is normal    Significant Labs: No new. Blood culture 11/29/2023: NGTD    Significant Imaging: No new  "

## 2023-11-30 NOTE — NURSING
[facility], [facility] [place]  Nurses Note -- 4 Eyes      11/30/2023       Skin assessed on: Q Shift      [x] No Pressure Injuries Present    [x]Prevention Measures Documented    [] Yes LDA  for Pressure Injury Previously documented     [] Yes New Pressure Injury Discovered   [] LDA for New Pressure Injury Added      Attending RN:  Lorrie Cheema LPN     Second RN:  Rachel SahaRN

## 2023-11-30 NOTE — PLAN OF CARE
Case Management Assessment     PCP: Leticia Arcos MD   Pharmacy: [facility]    Patient Arrived From: Home  Existing Help at Home: Spouse    Barriers to Discharge: none    Discharge Plan:    A. Home   B. Home with family    Patient independent from home and lives with spouse who will be her help at home. No current home dme or services. Continue to wean O2. Pt will need pulmonary and pcp follow up at discharge. In basket message sent for pulmonary clinic to contact patient to schedule. CM to continue to follow.      11/30/23 1331   Discharge Assessment   Assessment Type Discharge Planning Assessment   Confirmed/corrected address, phone number and insurance Yes   Confirmed Demographics Correct on Facesheet   Source of Information patient   Communicated CARIDAD with patient/caregiver Date not available/Unable to determine   People in Home spouse   Do you expect to return to your current living situation? Yes   Do you have help at home or someone to help you manage your care at home? Yes   Prior to hospitilization cognitive status: Alert/Oriented   Current cognitive status: Alert/Oriented   Equipment Currently Used at Home none   Readmission within 30 days? No   Patient currently being followed by outpatient case management? No   Do you currently have service(s) that help you manage your care at home? No   Do you take prescription medications? No   Do you have prescription coverage? Yes   Do you have any problems affording any of your prescribed medications? TBD   How do you get to doctors appointments? family or friend will provide   Are you on dialysis? No   Do you take coumadin? No   DME Needed Upon Discharge  other (see comments)  (TBD)   Discharge Plan discussed with: Patient   Transition of Care Barriers None

## 2023-12-01 VITALS
TEMPERATURE: 99 F | DIASTOLIC BLOOD PRESSURE: 94 MMHG | RESPIRATION RATE: 20 BRPM | BODY MASS INDEX: 20.97 KG/M2 | HEART RATE: 88 BPM | WEIGHT: 133.63 LBS | SYSTOLIC BLOOD PRESSURE: 160 MMHG | HEIGHT: 67 IN | OXYGEN SATURATION: 92 %

## 2023-12-01 LAB
ANION GAP SERPL CALC-SCNC: 8 MMOL/L (ref 8–16)
BUN SERPL-MCNC: 9 MG/DL (ref 8–23)
CALCIUM SERPL-MCNC: 9.2 MG/DL (ref 8.7–10.5)
CHLORIDE SERPL-SCNC: 110 MMOL/L (ref 95–110)
CO2 SERPL-SCNC: 25 MMOL/L (ref 23–29)
CREAT SERPL-MCNC: 0.7 MG/DL (ref 0.5–1.4)
EST. GFR  (NO RACE VARIABLE): >60 ML/MIN/1.73 M^2
GLUCOSE SERPL-MCNC: 83 MG/DL (ref 70–110)
POTASSIUM SERPL-SCNC: 3.5 MMOL/L (ref 3.5–5.1)
SODIUM SERPL-SCNC: 143 MMOL/L (ref 136–145)

## 2023-12-01 PROCEDURE — 25000242 PHARM REV CODE 250 ALT 637 W/ HCPCS: Mod: HCNC | Performed by: PEDIATRICS

## 2023-12-01 PROCEDURE — 63700000 PHARM REV CODE 250 ALT 637 W/O HCPCS: Mod: HCNC | Performed by: PEDIATRICS

## 2023-12-01 PROCEDURE — 36415 COLL VENOUS BLD VENIPUNCTURE: CPT | Mod: HCNC | Performed by: PEDIATRICS

## 2023-12-01 PROCEDURE — 80048 BASIC METABOLIC PNL TOTAL CA: CPT | Mod: HCNC | Performed by: PEDIATRICS

## 2023-12-01 PROCEDURE — 94761 N-INVAS EAR/PLS OXIMETRY MLT: CPT | Mod: HCNC

## 2023-12-01 PROCEDURE — 25000003 PHARM REV CODE 250: Mod: HCNC | Performed by: PEDIATRICS

## 2023-12-01 PROCEDURE — 94640 AIRWAY INHALATION TREATMENT: CPT | Mod: HCNC

## 2023-12-01 RX ORDER — AMOXICILLIN AND CLAVULANATE POTASSIUM 875; 125 MG/1; MG/1
1 TABLET, FILM COATED ORAL EVERY 12 HOURS
Status: DISCONTINUED | OUTPATIENT
Start: 2023-12-01 | End: 2023-12-01 | Stop reason: HOSPADM

## 2023-12-01 RX ORDER — AZITHROMYCIN 250 MG/1
250 TABLET, FILM COATED ORAL DAILY
Qty: 2 TABLET | Refills: 0 | Status: SHIPPED | OUTPATIENT
Start: 2023-12-02 | End: 2023-12-04

## 2023-12-01 RX ORDER — ACETAMINOPHEN 325 MG/1
650 TABLET ORAL EVERY 4 HOURS PRN
Refills: 0 | COMMUNITY
Start: 2023-12-01

## 2023-12-01 RX ORDER — IBUPROFEN 400 MG/1
600 TABLET ORAL EVERY 6 HOURS PRN
COMMUNITY
Start: 2023-12-01

## 2023-12-01 RX ORDER — AMOXICILLIN AND CLAVULANATE POTASSIUM 875; 125 MG/1; MG/1
1 TABLET, FILM COATED ORAL EVERY 12 HOURS
Qty: 6 TABLET | Refills: 0 | Status: SHIPPED | OUTPATIENT
Start: 2023-12-01 | End: 2023-12-04

## 2023-12-01 RX ADMIN — IPRATROPIUM BROMIDE AND ALBUTEROL SULFATE 3 ML: 2.5; .5 SOLUTION RESPIRATORY (INHALATION) at 07:12

## 2023-12-01 RX ADMIN — AZITHROMYCIN DIHYDRATE 250 MG: 250 TABLET ORAL at 09:12

## 2023-12-01 RX ADMIN — AMOXICILLIN AND CLAVULANATE POTASSIUM 1 TABLET: 875; 125 TABLET, FILM COATED ORAL at 09:12

## 2023-12-01 NOTE — PLAN OF CARE
Problem: Adult Inpatient Plan of Care  Goal: Patient-Specific Goal (Individualized)  Outcome: Ongoing, Progressing  Goal: Absence of Hospital-Acquired Illness or Injury  Outcome: Ongoing, Progressing  Goal: Optimal Comfort and Wellbeing  Outcome: Ongoing, Progressing     Problem: Skin Injury Risk Increased  Goal: Skin Health and Integrity  Outcome: Ongoing, Progressing     Problem: Gas Exchange Impaired  Goal: Optimal Gas Exchange  Outcome: Ongoing, Progressing

## 2023-12-01 NOTE — NURSING
[facility] - [place]  Nurses Note -- 4 Eyes      11/30/2023       Skin assessed on: Q Shift      [x] No Pressure Injuries Present    []Prevention Measures Documented    [] Yes LDA  for Pressure Injury Previously documented     [] Yes New Pressure Injury Discovered   [] LDA for New Pressure Injury Added      Attending RN:  Austin Logan RN     Second RN:  SANA Morris

## 2023-12-01 NOTE — NURSING
Bedside Report given to night nurse SANA Tomas. Walking rounds completed. Visualized and assessed patient NAD noted. Safety precautions maintained and call light within reach.     Chart check completed.

## 2023-12-01 NOTE — PLAN OF CARE
[place] [facility]  Discharge Final Note    All CM needs met. Follow up appointment with pulmonary scheduled and listed on AVS. Per physician, pt's sats adequate on RA, no home oxygen needed at this time. Pt stated her family will provide transportation home.     Primary Care Provider: Leticia Arcos MD    Expected Discharge Date: 12/1/2023    Final Discharge Note (most recent)       Final Note - 12/01/23 1047          Final Note    Assessment Type Final Discharge Note (P)      Anticipated Discharge Disposition Home or Self Care (P)      Hospital Resources/Appts/Education Provided Provided patient/caregiver with written discharge plan information;Appointments scheduled and added to AVS (P)         Post-Acute Status    Discharge Delays None known at this time (P)                      Important Message from Medicare             Contact Info       Leticia Arcos MD   Specialty: Family Medicine   Relationship: PCP - General    [address] [address]   Phone: [phone]       Next Steps: Call today    Instructions: for an appointment to be seen next week

## 2023-12-02 ENCOUNTER — NURSE TRIAGE (OUTPATIENT)
Dept: ADMINISTRATIVE | Facility: CLINIC | Age: 68
End: 2023-12-02
Payer: MEDICARE

## 2023-12-02 NOTE — TELEPHONE ENCOUNTER
PD Day 1 Pt; Admitted 11/29/23-12/01/23 with Pneumonia of LLL:    Attempted to call pt x2 with no answer. VM left with OOC callback number.   Reason for Disposition   Message left on unidentified voice mail.  Phone number verified.    Protocols used: No Contact or Duplicate Contact Call-A-

## 2023-12-02 NOTE — DISCHARGE SUMMARY
"[facility]  Discharge Summary    Patient Name: Lindsey Rust  MRN: [id number]  [facility]: [id number]  Patient Class: IP- Inpatient  Admission Date: 11/29/2023  Hospital Length of Stay: 2 days  Discharge Date and Time: 12/1/2023 12:49 PM  Attending Physician: Aylin Daniels MD  Discharging Provider: Aylin Daniels MD  Primary Care Provider: Leticia Arcos MD    Primary Care Team: [place] [id number]    Hospital Course: Lindsey Rust is a 68 year old female with chronic tobacco use who was admitted for management of acute hypoxic respiratory failure in the setting of left lower lobe bacterial pneumonia. CT findings were also concerning for emphysema.   She was started on supplemental oxygen (3L via nasal cannula) and received IV fluids, azithromycin, ceftriaxone, and duonebs. She remained afebrile and hemodynamically stable. She clinically improved and was monitored for continued hypoxia with weaning trials as tolerated. Smoking cessation was encouraged with risks of smoking addressed (worsening emphysema as well as COPD, heart disease, strokes, death). Outpatient referral to pulmonary was arranged for potential COPD evaluation due to concern for emphysema and continued tobacco use. Oral intake was encouraged. By the morning of 12/1/2023, she was doing well on room air and reported that she felt "100% better" and requested discharge home. She was discharged home in stable condition to complete a 5-day total course of a antibiotic therapy with azithromycin and amoxicillin-clavulanic acid. She voiced understanding of compliance with these medications as well as outpatient follow-up with her PCP as well as pulmonary.    Of note, influenza vaccination was offered however patient declined after being advised of the benefits especially with her underlying lung pathology from tobacco use. She was also encouraged to discuss updating her pneumococcal vaccination with her PCP if this has not yet been " "done.    * No surgery found *      Goals of Care Treatment Preferences:  Code Status: Full Code    Vitals:    12/01/23 1052   BP: 160/94   Pulse: 88   Resp: 20   Temp: 98.5 °F (36.9 °C)   Constitutional: She appears well-developed and well-nourished. No acute distress.  Head: Normocephalic and atraumatic. No abnormal appearing lesions noted.  Ears: External ears normal with no drainage   Eyes: Conjunctivae are normal. No eye drainage. Wearing glasses.  Nose: +nasal congestion. Nares patent. No nasal flaring. NC in place.  Mouth/Throat: Oropharynx is clear and moist. No posterior oropharyngeal erythema, ulcers, or exudates.  Neck: Phonation normal. Neck supple. FROM. No abnormal cervical/submandibular lymphadenopathy appreciated.  Cardiovascular:  RRR.  Exam reveals no gallop and no friction rub.  No murmur heard.  Pulmonary/Chest: Effort normal. No stridor. No respiratory distress. She has improving breath sounds LLL with no rales appreciated today. She has no wheezes.   Abdominal: Abdomen is soft. Bowel sounds are normal. She exhibits no distension. There is no abdominal tenderness. There is no rigidity, no rebound and no guarding.   Musculoskeletal: Normal tone and muscle bulk   Extremities: Small lesion on right arm ("a mole I picked at") with no infected appearing areas noted. No edema.  Neurological: She is alert and oriented. She has normal strength. No cranial nerve deficit or sensory deficit appreciated.  Skin: Skin is warm and dry. Capillary refill takes less than 2 seconds. No diffuse rash noted.   Psychiatric: Her behavior is normal. Her mood is normal    Consults:   Consults (From admission, onward)          Status Ordering Provider     Inpatient consult to Social Work  Once        Provider:  (Not yet assigned)    Completed LING STOVER          Final Active Diagnoses:    Diagnosis Date Noted POA    PRINCIPAL PROBLEM:  Acute hypoxic respiratory failure [J96.01] 11/29/2023 Yes      Problems " Resolved During this Admission:     Discharged Condition: stable    Disposition: Home or Self Care    Follow Up:   Follow-up Information       Leticia Arcos MD. Call today.    Specialty: Family Medicine  Why: for an appointment to be seen next week  Contact information:  [address] [facility] [facility]  [address]  [facility]  [place] [address]  [phone]                           Patient Instructions:      Ambulatory referral/consult to Pulmonology   Standing Status: Future   Referral Priority: Routine Referral Type: Consultation   Referral Reason: Specialty Services Required   Requested Specialty: Pulmonary Disease   Number of Visits Requested: 1     Significant Diagnostic Studies:    Influenza: negative. COVID-19: negative. Troponin 0.02 and BNP <5 (normal).                Significant Imaging:      CTA Chest 11/29/2023 Impression:  1. Limited assessment of segmental branches in the lingula and anterior basilar left lower lobe.  Remainder of pulmonary arteries exhibit no evidence for acute thromboembolism.  2. Focal subsegmental consolidation in the left lower lobe, perhaps pneumonia.  Given possibility of underlying neoplasm, recommend follow-up noncontrast chest CT in 3 months.  3. Moderate centrilobular emphysema.     LE U/S 11/29/2023 Impression: No sonographic evidence of DVT in the bilateral lower extremities.  Pending Diagnostic Studies:       Blood Culture 11/29/2023: NGTD           Medications:  Reconciled Home Medications:      Medication List        START taking these medications      acetaminophen 325 MG tablet  Commonly known as: TYLENOL  Take 2 tablets (650 mg total) by mouth every 4 (four) hours as needed for Pain.     amoxicillin-clavulanate 875-125mg 875-125 mg per tablet  Commonly known as: AUGMENTIN  Take 1 tablet by mouth every 12 (twelve) hours. for 3 days     azithromycin 250 MG tablet  Commonly known as: Z-MIRLANDE  Take 1 tablet (250 mg total) by mouth once daily. for 2 doses  Start taking  on: December 2, 2023            CHANGE how you take these medications      ibuprofen 400 MG tablet  Commonly known as: ADVIL,MOTRIN  Take 1.5 tablets (600 mg total) by mouth every 6 (six) hours as needed for Pain.  What changed: medication strength            STOP taking these medications      diazePAM 5 MG tablet  Commonly known as: VALIUM     HYDROcodone-acetaminophen 5-325 mg per tablet  Commonly known as: NORCO     levoFLOXacin 750 MG tablet  Commonly known as: LEVAQUIN     LIDOcaine 5 %  Commonly known as: LIDODERM     ondansetron 4 MG Tbdl  Commonly known as: ZOFRAN-ODT              Indwelling Lines/Drains at time of discharge:   Lines/Drains/Airways       None                 Time spent on the discharge of patient: 30+ minutes    Aylin Daniels MD  Department of Hospital Medicine  [facility] [place] [facility] S[facility]

## 2023-12-03 LAB
BACTERIA BLD CULT: NORMAL
BACTERIA BLD CULT: NORMAL

## 2023-12-04 ENCOUNTER — PATIENT OUTREACH (OUTPATIENT)
Dept: ADMINISTRATIVE | Facility: CLINIC | Age: 68
End: 2023-12-04
Payer: MEDICARE

## 2023-12-04 NOTE — PROGRESS NOTES
C3 nurse attempted to contact Lindsey Rust for a TCC post hospital discharge follow up call. No answer. Left voicemail with callback information. The patient does not have a scheduled HOSFU appointment. Message sent to PCP staff for assistance with scheduling visit with patient.

## 2023-12-05 NOTE — PROGRESS NOTES
3rd attempt for TCC Call; Left message with pt's relative for patient to return call to Zen @ [phone] with callback info. We will return her call.

## 2023-12-05 NOTE — PHYSICIAN QUERY
PT Name: Lindsey Rust  MR #: [id number]     DOCUMENTATION CLARIFICATION     CDS: Shavon Amaya RN, CCDS   Contact Information: [email or web address]    This form is a permanent document in the medical record.    Query Date: December 5, 2023    By submitting this query, we are merely seeking further clarification of documentation.  Please utilize your independent clinical judgment when addressing the question(s) below.  The Medical Record contains the following:   Indicators   Supporting Clinical Findings Location in Medical Record   x Pneumonia documented ...concern for difficulty breathing, likely related to left lower lobe bacterial pneumonia 11/29-12/1 Hospital Medicine   x Chest X-Ray/CT Scan Lungs are clear.  No significant infiltrate is seen  11/29 CXR   x PaO2    PaCO2     O2 sat  11/29/23 10:34   POC PH 7.536 (H)   POC PCO2 33.1 (L)   POC PO2 65 (L)   POC HCO3 28.0   POC SATURATED O2 95   Sample Arterial   DelSys Room Air    ABG   x Vital Signs Vital Signs (24h Range):  Temp:  [98.1 °F (36.7 °C)-98.2 °F (36.8 °C)] 98.2 °F (36.8 °C)  Pulse:  [] 89  Resp:  [18-24] 18  SpO2:  [88 %-97 %] 94 %  BP: (126-159)/() 145/67 11/29 H&P   x Cultures  11/29 Blood Culture: No Growth after 4 days.  11/29 Microbiology   x Treatment  Maintaining goal saturations on 3L supplemental oxygen via nasal cannula   Will treat underlying causes and adjust management of respiratory failure as follows: Azithromycin, Ceftriaxone, Duonebs, supplemental oxygen  11/29 H&P   x Other 68 year old female with chronic tobacco use who presented to the ED today due to concern for difficulty breathing.  11/29 H&P     Provider, due to conflicting clinical picture please clarify the Bacterial Pneumonia diagnosis:    [   ] Bacterial Pneumonia Ruled Out After Further Study   [X] Bacterial Pneumonia Ruled In per Physical Assessment and History, despite negative CXR  Chest CTA confirmed LLL consolidation   [   ] Unable to Rule Out  Bacterial Pneumonia    [   ] Other Clarification (please specify): _________   [   ] Clinically undetermined     Aylin Daniels MD  Internal Medicine Hospitalist  [facility]

## 2023-12-05 NOTE — PROGRESS NOTES
2nd attempt to make TCC Call. Left voicemail. Please call [phone] leave your first and last name and date of birth for Zen. I will return your call.

## 2023-12-05 NOTE — PROGRESS NOTES
C3 nurse spoke with Lindsey Rust for a TCC post hospital discharge follow up call. The patient does not have a scheduled HOSFU appointment with Leticia Arcso MD within 5-7 days post hospital discharge date 12/01/23. Offered to schedule appt but patient states she would call herself.

## 2024-01-30 ENCOUNTER — TELEPHONE (OUTPATIENT)
Dept: PRIMARY CARE CLINIC | Facility: CLINIC | Age: 69
End: 2024-01-30
Payer: MEDICARE

## 2024-03-04 PROBLEM — J96.01 ACUTE HYPOXIC RESPIRATORY FAILURE: Status: RESOLVED | Noted: 2023-11-29 | Resolved: 2024-03-04
